# Patient Record
Sex: FEMALE | Race: WHITE | Employment: FULL TIME | URBAN - METROPOLITAN AREA
[De-identification: names, ages, dates, MRNs, and addresses within clinical notes are randomized per-mention and may not be internally consistent; named-entity substitution may affect disease eponyms.]

---

## 2015-09-23 LAB
AVERAGE GLUCOSE: 108
HBA1C MFR BLD: 5.4 %

## 2019-06-29 ENCOUNTER — HOSPITAL ENCOUNTER (OUTPATIENT)
Age: 59
Discharge: HOME | End: 2019-07-01
Payer: COMMERCIAL

## 2019-06-29 LAB
ALBUMIN SERPL-MCNC: 4.3 G/DL (ref 3.5–5.2)
ALP BLD-CCNC: 71 U/L (ref 35–104)
ALT SERPL-CCNC: 16 U/L (ref 0–32)
ANION GAP SERPL CALCULATED.3IONS-SCNC: 13 MMOL/L (ref 7–16)
AST SERPL-CCNC: 19 U/L (ref 0–31)
BACTERIA: ABNORMAL /HPF
BASOPHILS ABSOLUTE: 0.07 E9/L (ref 0–0.2)
BASOPHILS RELATIVE PERCENT: 1.1 % (ref 0–2)
BILIRUB SERPL-MCNC: 0.4 MG/DL (ref 0–1.2)
BILIRUBIN URINE: NEGATIVE
BLOOD, URINE: NEGATIVE
BUN BLDV-MCNC: 16 MG/DL (ref 6–20)
CALCIUM SERPL-MCNC: 9.9 MG/DL (ref 8.6–10.2)
CHLORIDE BLD-SCNC: 109 MMOL/L (ref 98–107)
CHOLESTEROL, TOTAL: 189 MG/DL (ref 0–199)
CLARITY: CLEAR
CO2: 22 MMOL/L (ref 22–29)
COLOR: YELLOW
CREAT SERPL-MCNC: 0.8 MG/DL (ref 0.5–1)
EOSINOPHILS ABSOLUTE: 0.27 E9/L (ref 0.05–0.5)
EOSINOPHILS RELATIVE PERCENT: 4.2 % (ref 0–6)
GFR AFRICAN AMERICAN: >60
GFR NON-AFRICAN AMERICAN: >60 ML/MIN/1.73
GLUCOSE BLD-MCNC: 93 MG/DL (ref 74–99)
GLUCOSE URINE: NEGATIVE MG/DL
HCT VFR BLD CALC: 40 % (ref 34–48)
HDLC SERPL-MCNC: 65 MG/DL
HEMOGLOBIN: 12.5 G/DL (ref 11.5–15.5)
IMMATURE GRANULOCYTES #: 0.02 E9/L
IMMATURE GRANULOCYTES %: 0.3 % (ref 0–5)
KETONES, URINE: NEGATIVE MG/DL
LDL CHOLESTEROL CALCULATED: 114 MG/DL (ref 0–99)
LEUKOCYTE ESTERASE, URINE: ABNORMAL
LYMPHOCYTES ABSOLUTE: 2.61 E9/L (ref 1.5–4)
LYMPHOCYTES RELATIVE PERCENT: 40.3 % (ref 20–42)
MCH RBC QN AUTO: 29.8 PG (ref 26–35)
MCHC RBC AUTO-ENTMCNC: 31.3 % (ref 32–34.5)
MCV RBC AUTO: 95.2 FL (ref 80–99.9)
MONOCYTES ABSOLUTE: 0.43 E9/L (ref 0.1–0.95)
MONOCYTES RELATIVE PERCENT: 6.6 % (ref 2–12)
NEUTROPHILS ABSOLUTE: 3.07 E9/L (ref 1.8–7.3)
NEUTROPHILS RELATIVE PERCENT: 47.5 % (ref 43–80)
NITRITE, URINE: NEGATIVE
PDW BLD-RTO: 13.3 FL (ref 11.5–15)
PH UA: 5.5 (ref 5–9)
PLATELET # BLD: 312 E9/L (ref 130–450)
PMV BLD AUTO: 10.3 FL (ref 7–12)
POTASSIUM SERPL-SCNC: 5 MMOL/L (ref 3.5–5)
PROTEIN UA: NEGATIVE MG/DL
RBC # BLD: 4.2 E12/L (ref 3.5–5.5)
RBC UA: ABNORMAL /HPF (ref 0–2)
SODIUM BLD-SCNC: 144 MMOL/L (ref 132–146)
SPECIFIC GRAVITY UA: 1.02 (ref 1–1.03)
TOTAL PROTEIN: 6.9 G/DL (ref 6.4–8.3)
TRIGL SERPL-MCNC: 49 MG/DL (ref 0–149)
UROBILINOGEN, URINE: 0.2 E.U./DL
VITAMIN D 25-HYDROXY: 46 NG/ML (ref 30–100)
VLDLC SERPL CALC-MCNC: 10 MG/DL
WBC # BLD: 6.5 E9/L (ref 4.5–11.5)
WBC UA: ABNORMAL /HPF (ref 0–5)

## 2019-06-29 PROCEDURE — 82306 VITAMIN D 25 HYDROXY: CPT

## 2019-06-29 PROCEDURE — 36415 COLL VENOUS BLD VENIPUNCTURE: CPT

## 2019-06-29 PROCEDURE — 80053 COMPREHEN METABOLIC PANEL: CPT

## 2019-06-29 PROCEDURE — 80061 LIPID PANEL: CPT

## 2019-06-29 PROCEDURE — 85025 COMPLETE CBC W/AUTO DIFF WBC: CPT

## 2019-06-29 PROCEDURE — 81001 URINALYSIS AUTO W/SCOPE: CPT

## 2019-08-07 ENCOUNTER — OFFICE VISIT (OUTPATIENT)
Dept: PRIMARY CARE CLINIC | Age: 59
End: 2019-08-07
Payer: COMMERCIAL

## 2019-08-07 VITALS
DIASTOLIC BLOOD PRESSURE: 76 MMHG | SYSTOLIC BLOOD PRESSURE: 122 MMHG | BODY MASS INDEX: 27.28 KG/M2 | WEIGHT: 180 LBS | OXYGEN SATURATION: 98 % | HEIGHT: 68 IN | HEART RATE: 70 BPM | TEMPERATURE: 97.8 F

## 2019-08-07 DIAGNOSIS — N39.41 URGE INCONTINENCE: ICD-10-CM

## 2019-08-07 DIAGNOSIS — R09.81 NASAL CONGESTION: Primary | ICD-10-CM

## 2019-08-07 DIAGNOSIS — M85.80 OSTEOPENIA, UNSPECIFIED LOCATION: ICD-10-CM

## 2019-08-07 PROCEDURE — 99214 OFFICE O/P EST MOD 30 MIN: CPT | Performed by: FAMILY MEDICINE

## 2019-08-07 RX ORDER — TOLTERODINE 4 MG/1
4 CAPSULE, EXTENDED RELEASE ORAL DAILY
Refills: 1 | COMMUNITY
Start: 2019-07-23

## 2019-08-07 RX ORDER — AZELASTINE 1 MG/ML
1 SPRAY, METERED NASAL 2 TIMES DAILY
Qty: 1 BOTTLE | Refills: 5 | Status: SHIPPED | OUTPATIENT
Start: 2019-08-07

## 2019-08-07 RX ORDER — AZELASTINE 1 MG/ML
1 SPRAY, METERED NASAL 2 TIMES DAILY
COMMUNITY
End: 2019-08-07 | Stop reason: SDUPTHER

## 2019-08-07 ASSESSMENT — ENCOUNTER SYMPTOMS
NAUSEA: 0
WHEEZING: 0
PHOTOPHOBIA: 0
BLOOD IN STOOL: 0
ABDOMINAL PAIN: 0
BACK PAIN: 0
COLOR CHANGE: 0
SINUS PRESSURE: 0
EYE REDNESS: 0
ABDOMINAL DISTENTION: 0
EYE PAIN: 0
EYE ITCHING: 0
SORE THROAT: 0
VOMITING: 0
COUGH: 0
DIARRHEA: 0
SHORTNESS OF BREATH: 0
SINUS PAIN: 0
CONSTIPATION: 0
CHEST TIGHTNESS: 0

## 2019-08-07 ASSESSMENT — PATIENT HEALTH QUESTIONNAIRE - PHQ9
SUM OF ALL RESPONSES TO PHQ QUESTIONS 1-9: 0
SUM OF ALL RESPONSES TO PHQ QUESTIONS 1-9: 0
SUM OF ALL RESPONSES TO PHQ9 QUESTIONS 1 & 2: 0
1. LITTLE INTEREST OR PLEASURE IN DOING THINGS: 0
2. FEELING DOWN, DEPRESSED OR HOPELESS: 0

## 2019-08-07 NOTE — PROGRESS NOTES
abused: Not on file     Forced sexual activity: Not on file   Other Topics Concern    Not on file   Social History Narrative    Not on file       Vitals:    08/07/19 1515   BP: 122/76   Pulse: 70   Temp: 97.8 °F (36.6 °C)   SpO2: 98%   Weight: 180 lb (81.6 kg)   Height: 5' 8\" (1.727 m)       Exam:  Physical Exam   Constitutional: She is oriented to person, place, and time. She appears well-developed and well-nourished. HENT:   Head: Normocephalic and atraumatic. Right Ear: External ear normal.   Left Ear: External ear normal.   Nose: Nose normal.   Mouth/Throat: Oropharynx is clear and moist.   Eyes: Pupils are equal, round, and reactive to light. Conjunctivae and EOM are normal.   Neck: Normal range of motion. Neck supple. Cardiovascular: Normal rate, regular rhythm, normal heart sounds and intact distal pulses. Pulmonary/Chest: Effort normal and breath sounds normal.   Abdominal: Soft. Bowel sounds are normal.   Musculoskeletal: Normal range of motion. Neurological: She is alert and oriented to person, place, and time. Skin: Skin is warm and dry. No rash noted. Psychiatric: She has a normal mood and affect.        Assessment and Plan:    Problem List        Musculoskeletal and Integument    Osteopenia    Relevant Orders    DEXA BONE DENSITY 2 SITES    Comprehensive Metabolic Panel    CBC Auto Differential    Lipid Panel    Urinalysis    Vitamin D 25 Hydroxy       Other    Urge incontinence    Relevant Medications    tolterodine (DETROL LA) 4 MG extended release capsule    Other Relevant Orders    Comprehensive Metabolic Panel    Nasal congestion - Primary    Relevant Medications    azelastine (ASTELIN) 0.1 % nasal spray    Other Relevant Orders    CBC Auto Differential    Lipid Panel       reviewed bw as discussed above  Colonoscopy 12/2016--ext hemorrhoids, mild diverticulosis, recheck in 10 years  utd mammogram  utd pap/pelvic exam  dexa scan 3/2016 spine --1.7, hip--1.6, patient currently taking

## 2019-08-28 ENCOUNTER — HOSPITAL ENCOUNTER (OUTPATIENT)
Dept: MAMMOGRAPHY | Age: 59
Discharge: HOME | End: 2019-08-30
Payer: COMMERCIAL

## 2019-08-28 DIAGNOSIS — M85.80 OSTEOPENIA, UNSPECIFIED LOCATION: ICD-10-CM

## 2019-08-28 PROCEDURE — 77080 DXA BONE DENSITY AXIAL: CPT

## 2019-10-31 ENCOUNTER — HOSPITAL ENCOUNTER (OUTPATIENT)
Age: 59
Discharge: HOME | End: 2019-11-02
Payer: COMMERCIAL

## 2019-10-31 ENCOUNTER — OFFICE VISIT (OUTPATIENT)
Dept: FAMILY MEDICINE CLINIC | Age: 59
End: 2019-10-31
Payer: COMMERCIAL

## 2019-10-31 VITALS
WEIGHT: 184.13 LBS | DIASTOLIC BLOOD PRESSURE: 70 MMHG | BODY MASS INDEX: 27.91 KG/M2 | HEIGHT: 68 IN | OXYGEN SATURATION: 99 % | TEMPERATURE: 98 F | SYSTOLIC BLOOD PRESSURE: 116 MMHG | HEART RATE: 72 BPM

## 2019-10-31 DIAGNOSIS — N30.01 ACUTE CYSTITIS WITH HEMATURIA: ICD-10-CM

## 2019-10-31 DIAGNOSIS — R30.0 DYSURIA: Primary | ICD-10-CM

## 2019-10-31 LAB
BILIRUBIN, POC: ABNORMAL
BLOOD URINE, POC: ABNORMAL
CLARITY, POC: ABNORMAL
COLOR, POC: YELLOW
GLUCOSE URINE, POC: ABNORMAL
KETONES, POC: ABNORMAL
LEUKOCYTE EST, POC: ABNORMAL
NITRITE, POC: ABNORMAL
PH, POC: 6
PROTEIN, POC: ABNORMAL
SPECIFIC GRAVITY, POC: 1.02
UROBILINOGEN, POC: 0.2

## 2019-10-31 PROCEDURE — G8427 DOCREV CUR MEDS BY ELIG CLIN: HCPCS | Performed by: FAMILY MEDICINE

## 2019-10-31 PROCEDURE — 3017F COLORECTAL CA SCREEN DOC REV: CPT | Performed by: FAMILY MEDICINE

## 2019-10-31 PROCEDURE — G8484 FLU IMMUNIZE NO ADMIN: HCPCS | Performed by: FAMILY MEDICINE

## 2019-10-31 PROCEDURE — 99213 OFFICE O/P EST LOW 20 MIN: CPT | Performed by: FAMILY MEDICINE

## 2019-10-31 PROCEDURE — G9899 SCRN MAM PERF RSLTS DOC: HCPCS | Performed by: FAMILY MEDICINE

## 2019-10-31 PROCEDURE — 87088 URINE BACTERIA CULTURE: CPT

## 2019-10-31 PROCEDURE — G8419 CALC BMI OUT NRM PARAM NOF/U: HCPCS | Performed by: FAMILY MEDICINE

## 2019-10-31 PROCEDURE — 1036F TOBACCO NON-USER: CPT | Performed by: FAMILY MEDICINE

## 2019-10-31 PROCEDURE — 87186 SC STD MICRODIL/AGAR DIL: CPT

## 2019-10-31 PROCEDURE — 81002 URINALYSIS NONAUTO W/O SCOPE: CPT | Performed by: FAMILY MEDICINE

## 2019-10-31 RX ORDER — CIPROFLOXACIN 500 MG/1
500 TABLET, FILM COATED ORAL 2 TIMES DAILY
Qty: 14 TABLET | Refills: 0 | Status: SHIPPED | OUTPATIENT
Start: 2019-10-31 | End: 2019-11-07

## 2019-10-31 ASSESSMENT — ENCOUNTER SYMPTOMS
WHEEZING: 0
EYE PAIN: 0
BACK PAIN: 0
CONSTIPATION: 0
VOMITING: 0
DIARRHEA: 0
SINUS PAIN: 0
COUGH: 0
TROUBLE SWALLOWING: 0
NAUSEA: 0
CHEST TIGHTNESS: 0
ABDOMINAL PAIN: 0
SORE THROAT: 0
SHORTNESS OF BREATH: 0

## 2019-11-02 LAB
ORGANISM: ABNORMAL
URINE CULTURE, ROUTINE: ABNORMAL

## 2019-11-04 DIAGNOSIS — B37.9 CANDIDIASIS: Primary | ICD-10-CM

## 2019-11-04 RX ORDER — FLUCONAZOLE 150 MG/1
150 TABLET ORAL DAILY
Qty: 3 TABLET | Refills: 0 | Status: SHIPPED | OUTPATIENT
Start: 2019-11-04 | End: 2019-11-07

## 2019-11-05 ENCOUNTER — TELEPHONE (OUTPATIENT)
Dept: FAMILY MEDICINE CLINIC | Age: 59
End: 2019-11-05

## 2020-03-05 ENCOUNTER — OFFICE VISIT (OUTPATIENT)
Dept: FAMILY MEDICINE CLINIC | Age: 60
End: 2020-03-05
Payer: COMMERCIAL

## 2020-03-05 VITALS
DIASTOLIC BLOOD PRESSURE: 70 MMHG | SYSTOLIC BLOOD PRESSURE: 122 MMHG | TEMPERATURE: 98.6 F | HEART RATE: 83 BPM | OXYGEN SATURATION: 98 %

## 2020-03-05 LAB
INFLUENZA A ANTIBODY: POSITIVE
INFLUENZA B ANTIBODY: NEGATIVE

## 2020-03-05 PROCEDURE — G8419 CALC BMI OUT NRM PARAM NOF/U: HCPCS | Performed by: NURSE PRACTITIONER

## 2020-03-05 PROCEDURE — 87804 INFLUENZA ASSAY W/OPTIC: CPT | Performed by: NURSE PRACTITIONER

## 2020-03-05 PROCEDURE — G8484 FLU IMMUNIZE NO ADMIN: HCPCS | Performed by: NURSE PRACTITIONER

## 2020-03-05 PROCEDURE — 3017F COLORECTAL CA SCREEN DOC REV: CPT | Performed by: NURSE PRACTITIONER

## 2020-03-05 PROCEDURE — 99213 OFFICE O/P EST LOW 20 MIN: CPT | Performed by: NURSE PRACTITIONER

## 2020-03-05 PROCEDURE — 1036F TOBACCO NON-USER: CPT | Performed by: NURSE PRACTITIONER

## 2020-03-05 PROCEDURE — G8427 DOCREV CUR MEDS BY ELIG CLIN: HCPCS | Performed by: NURSE PRACTITIONER

## 2020-03-05 RX ORDER — BROMPHENIRAMINE MALEATE, PSEUDOEPHEDRINE HYDROCHLORIDE, AND DEXTROMETHORPHAN HYDROBROMIDE 2; 30; 10 MG/5ML; MG/5ML; MG/5ML
10 SYRUP ORAL 4 TIMES DAILY PRN
Qty: 240 ML | Refills: 0 | Status: SHIPPED
Start: 2020-03-05 | End: 2021-10-28

## 2020-03-05 RX ORDER — AMPICILLIN TRIHYDRATE 250 MG
1000 CAPSULE ORAL
COMMUNITY

## 2020-03-05 NOTE — PROGRESS NOTES
Chief Complaint   Headache and Influenza (started 3/3)      History of Present Illness   Source of history provided by:  patient. Lorena Myers is a 61 y.o. old female who has a past medical history of:   Past Medical History:   Diagnosis Date    Nasal congestion     Osteopenia     Urge incontinence     Presents to the [facility] care with complaints of a nonproductive cough, body aches, chills, mild nausea, sore throat, and nasal congestion/drainage. States symptoms have been present x 3-4 days. States symptoms have progressed since onset. Denies any CP, SOB, abdominal pain, vomiting, diarrhea, rash, or lethargy. Has been taking Claritin D and Nyquil without symptomatic relief. Denies any hx of asthma, COPD, or tobacco use. Did not receive influenza vaccination this season. Review of Systems   Unless otherwise stated in this report or unable to obtain because of the patient's clinical or mental status as evidenced by the medical record, this patients's positive and negative responses for Review of Systems, constitutional, psych, eyes, ENT, cardiovascular, respiratory, gastrointestinal, neurological, genitourinary, musculoskeletal, integument systems and systems related to the presenting problem are either stated in the preceding or were negative for the symptoms and/or complaints related to the medical problem. Surgical History:  Past Surgical History:   Procedure Laterality Date    ENDOMETRIAL ABLATION  11/2007    Isamar Owens Right 07/2014    TUBAL LIGATION  1992     Social History:  reports that she has never smoked. She has never used smokeless tobacco. She reports current alcohol use.   Family History: family history includes Alzheimer's Disease in her mother; COPD in her father; Colon Polyps in her brother; High Blood Pressure in her mother; High Cholesterol in her

## 2020-03-07 ENCOUNTER — OFFICE VISIT (OUTPATIENT)
Dept: FAMILY MEDICINE CLINIC | Age: 60
End: 2020-03-07
Payer: COMMERCIAL

## 2020-03-07 VITALS
OXYGEN SATURATION: 99 % | HEART RATE: 105 BPM | DIASTOLIC BLOOD PRESSURE: 72 MMHG | TEMPERATURE: 99 F | SYSTOLIC BLOOD PRESSURE: 128 MMHG

## 2020-03-07 PROBLEM — J40 BRONCHITIS: Status: ACTIVE | Noted: 2020-03-07

## 2020-03-07 PROBLEM — J10.1 INFLUENZA A: Status: ACTIVE | Noted: 2020-03-07

## 2020-03-07 PROBLEM — J01.90 ACUTE INFECTION OF NASAL SINUS: Status: ACTIVE | Noted: 2020-03-07

## 2020-03-07 LAB
INFLUENZA A ANTIBODY: NORMAL
INFLUENZA B ANTIBODY: NORMAL

## 2020-03-07 PROCEDURE — 99213 OFFICE O/P EST LOW 20 MIN: CPT | Performed by: FAMILY MEDICINE

## 2020-03-07 PROCEDURE — 1036F TOBACCO NON-USER: CPT | Performed by: FAMILY MEDICINE

## 2020-03-07 PROCEDURE — 87804 INFLUENZA ASSAY W/OPTIC: CPT | Performed by: FAMILY MEDICINE

## 2020-03-07 PROCEDURE — G8484 FLU IMMUNIZE NO ADMIN: HCPCS | Performed by: FAMILY MEDICINE

## 2020-03-07 PROCEDURE — G8427 DOCREV CUR MEDS BY ELIG CLIN: HCPCS | Performed by: FAMILY MEDICINE

## 2020-03-07 PROCEDURE — G8419 CALC BMI OUT NRM PARAM NOF/U: HCPCS | Performed by: FAMILY MEDICINE

## 2020-03-07 PROCEDURE — 3017F COLORECTAL CA SCREEN DOC REV: CPT | Performed by: FAMILY MEDICINE

## 2020-03-07 RX ORDER — BENZONATATE 100 MG/1
100 CAPSULE ORAL 3 TIMES DAILY PRN
Qty: 30 CAPSULE | Refills: 0 | Status: SHIPPED | OUTPATIENT
Start: 2020-03-07 | End: 2020-03-14

## 2020-03-07 RX ORDER — OSELTAMIVIR PHOSPHATE 75 MG/1
75 CAPSULE ORAL 2 TIMES DAILY
Qty: 10 CAPSULE | Refills: 0 | Status: SHIPPED | OUTPATIENT
Start: 2020-03-07 | End: 2020-03-12

## 2020-03-07 RX ORDER — PREDNISONE 20 MG/1
TABLET ORAL
Qty: 15 TABLET | Refills: 0 | Status: SHIPPED | OUTPATIENT
Start: 2020-03-07 | End: 2021-10-28

## 2020-03-07 RX ORDER — DOXYCYCLINE HYCLATE 100 MG
100 TABLET ORAL 2 TIMES DAILY
Qty: 20 TABLET | Refills: 0 | Status: SHIPPED | OUTPATIENT
Start: 2020-03-07 | End: 2020-03-17

## 2020-03-07 NOTE — PROGRESS NOTES
Subjective:  Chief Complaint   Patient presents with    Cough    Head Congestion    Fever       HPI:  The patient states that they have had fever and chills for the last 5 days. The patient states fever temperature max is 100. The patient admits to malaise and myalgias. No contact exposures. The patient admits to  cough, runny nose, nasal congestion and sore throat. Cough productive of yellow phlegm  The patient denies nausea and vomiting. Bowel movement have been normal color and consistency. No diarrhea. No black or bloody stools. The patient denies dysuria urinary frequency, urgency and or gross hematuria. No flank pain. No chest pain or dyspnea. Patient present to the urgent care for evaluation. Was here 3/5 saw Cristy Martinez, tested positive for flu a, since ill for 72 hours was not given tamiflu, was given cough medication. ROS:  Positive and pertinent negatives as per HPI. All other systems are reviewed and negative.        Current Outpatient Medications:     doxycycline hyclate (VIBRA-TABS) 100 MG tablet, Take 1 tablet by mouth 2 times daily for 10 days, Disp: 20 tablet, Rfl: 0    predniSONE (DELTASONE) 20 MG tablet, 1 po bid x 5 days then 1 po daily x 5 days, Disp: 15 tablet, Rfl: 0    benzonatate (TESSALON) 100 MG capsule, Take 1 capsule by mouth 3 times daily as needed for Cough, Disp: 30 capsule, Rfl: 0    oseltamivir (TAMIFLU) 75 MG capsule, Take 1 capsule by mouth 2 times daily for 5 days, Disp: 10 capsule, Rfl: 0    Cinnamon 500 MG CAPS, Take by mouth, Disp: , Rfl:     brompheniramine-pseudoephedrine-DM 2-30-10 MG/5ML syrup, Take 10 mLs by mouth 4 times daily as needed for Congestion or Cough, Disp: 240 mL, Rfl: 0    tolterodine (DETROL LA) 4 MG extended release capsule, , Disp: , Rfl: 1    azelastine (ASTELIN) 0.1 % nasal spray, 1 spray by Nasal route 2 times daily Use in each nostril as directed, Disp: 1 Bottle, Rfl: 5   Allergies   Allergen Reactions    Penicillins Objective:  Vitals:    03/07/20 1046   BP: 128/72   Pulse: 105   Temp: 99 °F (37.2 °C)   SpO2: 99%        Exam:  Const: Appears healthy and well developed. No signs of acute distress present. Vitals reviewed per triage. Non-toxic appearing. Head/Face: Normocephalic, atraumatic. Facies is symmetric. Eyes: PERRL. ENMT:  Tympanic membranes are pearly gray with good light reflex bilaterally. Nares with discolored discharge. Buccal mucosa is moist. Erythema and yellow pnd in posterior pharynx. Neck: Supple and symmetric. Palpation reveals no adenopathy. No meninigeal signs. Trachea midline. Resp: Lungs with coarse breath sounds noted throughout. CV: Rhythm is regular. S1 is normal. S2 is normal. Extremities: No edema of the lower limbs bilaterally. Pulses are equal bilaterally. Abdomen: Abdomen soft, nontender to palpation. No masses or organomegaly. No rebound or guarding. Musculo: Walks with a normal gait. Patient moves extremities without pain or limitation. Skin: Skin is warm and dry. Neuro: Alert and oriented x3. Speech is articulate and fluent. Psych: Patient's mood and affect is appropriate     Diagnosis Orders   1. Acute non-recurrent sinusitis of other sinus  doxycycline hyclate (VIBRA-TABS) 100 MG tablet    predniSONE (DELTASONE) 20 MG tablet   2. Bronchitis  doxycycline hyclate (VIBRA-TABS) 100 MG tablet    predniSONE (DELTASONE) 20 MG tablet    benzonatate (TESSALON) 100 MG capsule   3. Influenza A  oseltamivir (TAMIFLU) 75 MG capsule     Fluids, rest, probiotic, nasal saline spray, cool mist vaporizer   Discussed the use of probiotics to help prevent GI issues including C-diff colitis.       Seen By:    Mark Rivero MD

## 2020-03-11 ENCOUNTER — OFFICE VISIT (OUTPATIENT)
Dept: FAMILY MEDICINE CLINIC | Age: 60
End: 2020-03-11
Payer: COMMERCIAL

## 2020-03-11 VITALS
OXYGEN SATURATION: 98 % | SYSTOLIC BLOOD PRESSURE: 126 MMHG | HEART RATE: 86 BPM | DIASTOLIC BLOOD PRESSURE: 78 MMHG | TEMPERATURE: 98.2 F

## 2020-03-11 PROCEDURE — G8484 FLU IMMUNIZE NO ADMIN: HCPCS | Performed by: FAMILY MEDICINE

## 2020-03-11 PROCEDURE — G8419 CALC BMI OUT NRM PARAM NOF/U: HCPCS | Performed by: FAMILY MEDICINE

## 2020-03-11 PROCEDURE — 3017F COLORECTAL CA SCREEN DOC REV: CPT | Performed by: FAMILY MEDICINE

## 2020-03-11 PROCEDURE — 99213 OFFICE O/P EST LOW 20 MIN: CPT | Performed by: FAMILY MEDICINE

## 2020-03-11 PROCEDURE — G8427 DOCREV CUR MEDS BY ELIG CLIN: HCPCS | Performed by: FAMILY MEDICINE

## 2020-03-11 PROCEDURE — 1036F TOBACCO NON-USER: CPT | Performed by: FAMILY MEDICINE

## 2020-03-11 ASSESSMENT — ENCOUNTER SYMPTOMS
CHEST TIGHTNESS: 1
GASTROINTESTINAL NEGATIVE: 1
COUGH: 1
SORE THROAT: 1
EYES NEGATIVE: 1
WHEEZING: 1
TROUBLE SWALLOWING: 1

## 2020-03-11 NOTE — PROGRESS NOTES
3/11/2020     Rey Betancourt (:  1960) is a 61 y.o. female, here for evaluation of the following medical concerns:    HPI  Patient here for worsening influenza A. Patient was here twice in the last week. Tested positive for influenza both times. Was only given medication at the second visit. Was also given doxycycline. Told to come in for chest x-ray if no better. Order was now placed. Could not reach her PCP for some reason. Was told to come to urgent care as no one could place orders. Still symptomatic. No further fevers. Concerned because of a history of pneumonia. Review of Systems   Constitutional: Positive for fatigue and fever. HENT: Positive for postnasal drip, sore throat and trouble swallowing. Eyes: Negative. Respiratory: Positive for cough, chest tightness and wheezing. Cardiovascular: Negative. Gastrointestinal: Negative. Musculoskeletal: Negative for neck pain. Skin: Negative for rash. Neurological: Negative for headaches. Hematological: Negative for adenopathy. All other systems reviewed and are negative. Prior to Visit Medications    Medication Sig Taking?  Authorizing Provider   doxycycline hyclate (VIBRA-TABS) 100 MG tablet Take 1 tablet by mouth 2 times daily for 10 days Yes Chante Molina MD   predniSONE (DELTASONE) 20 MG tablet 1 po bid x 5 days then 1 po daily x 5 days Yes Chante Molina MD   benzonatate (TESSALON) 100 MG capsule Take 1 capsule by mouth 3 times daily as needed for Cough Yes Chante Molina MD   oseltamivir (TAMIFLU) 75 MG capsule Take 1 capsule by mouth 2 times daily for 5 days Yes Chante Molina MD   Cinnamon 500 MG CAPS Take by mouth Yes Historical Provider, MD   tolterodine (DETROL LA) 4 MG extended release capsule  Yes Historical Provider, MD   azelastine (ASTELIN) 0.1 % nasal spray 1 spray by Nasal route 2 times daily Use in each nostril as directed Yes Chante Molina MD   brompheniramine-pseudoephedrine-DM 2-30-10 MG/5ML reveals no acute signs of consolidation or pneumonia. May be positive for atypical pneumonia. Final read per radiologist.  Albert Ciscodesiree patient continue current medications and go to ER if any worsening of symptoms in the interim. Velasquez Troncoso D.O.   8:52 AM  3/11/2020       This document may have been prepared at least partially through the use of voice recognition software. Although effort is taken to assure the accuracy of this document, it is possible that grammatical, syntax,  or spelling errors may occur.

## 2020-04-06 PROBLEM — J10.1 INFLUENZA A: Status: RESOLVED | Noted: 2020-03-07 | Resolved: 2020-04-06

## 2021-04-08 ENCOUNTER — OFFICE VISIT (OUTPATIENT)
Dept: NEUROLOGY | Age: 61
End: 2021-04-08
Payer: COMMERCIAL

## 2021-04-08 VITALS
HEIGHT: 69 IN | RESPIRATION RATE: 20 BRPM | SYSTOLIC BLOOD PRESSURE: 130 MMHG | DIASTOLIC BLOOD PRESSURE: 82 MMHG | HEART RATE: 74 BPM | WEIGHT: 174 LBS | BODY MASS INDEX: 25.77 KG/M2

## 2021-04-08 DIAGNOSIS — G25.0 FAMILIAL TREMOR: Primary | ICD-10-CM

## 2021-04-08 PROCEDURE — 1036F TOBACCO NON-USER: CPT | Performed by: CLINICAL NURSE SPECIALIST

## 2021-04-08 PROCEDURE — 99204 OFFICE O/P NEW MOD 45 MIN: CPT | Performed by: CLINICAL NURSE SPECIALIST

## 2021-04-08 PROCEDURE — G8427 DOCREV CUR MEDS BY ELIG CLIN: HCPCS | Performed by: CLINICAL NURSE SPECIALIST

## 2021-04-08 PROCEDURE — G8419 CALC BMI OUT NRM PARAM NOF/U: HCPCS | Performed by: CLINICAL NURSE SPECIALIST

## 2021-04-08 PROCEDURE — 3017F COLORECTAL CA SCREEN DOC REV: CPT | Performed by: CLINICAL NURSE SPECIALIST

## 2021-04-08 RX ORDER — KRILL/OM-3/DHA/EPA/PHOSPHO/AST 500MG-86MG
CAPSULE ORAL DAILY
COMMUNITY

## 2021-04-08 NOTE — PROGRESS NOTES
Esha Gardner is a 64 y.o. right handed woman    Past Medical History:     Past Medical History:   Diagnosis Date    Nasal congestion     Osteopenia     Urge incontinence      Past Surgical History:     Past Surgical History:   Procedure Laterality Date    ENDOMETRIAL ABLATION  11/2007    Isamar - Dr. Guthrie Burke Right 07/2014    TUBAL LIGATION  1992     Allergies:     Penicillins    Medications:     Prior to Admission medications    Medication Sig Start Date End Date Taking?  Authorizing Provider   Nghia Mendoza 500 MG CAPS Take by mouth   Yes Historical Provider, MD   Cinnamon 500 MG CAPS Take by mouth   Yes Historical Provider, MD   tolterodine (DETROL LA) 4 MG extended release capsule  7/23/19  Yes Historical Provider, MD   azelastine (ASTELIN) 0.1 % nasal spray 1 spray by Nasal route 2 times daily Use in each nostril as directed 8/7/19  Yes Irl Hammans, MD   predniSONE (DELTASONE) 20 MG tablet 1 po bid x 5 days then 1 po daily x 5 days  Patient not taking: Reported on 4/8/2021 3/7/20   Irl Hammans, MD   brompheniramine-pseudoephedrine-DM 2-30-10 MG/5ML syrup Take 10 mLs by mouth 4 times daily as needed for Congestion or Cough  Patient not taking: Reported on 3/11/2020 3/5/20   LIVIA Dorman - CNP     Social History:     Social History     Tobacco Use    Smoking status: Never Smoker    Smokeless tobacco: Never Used   Substance Use Topics    Alcohol use: Yes     Comment: Rarely    Drug use: Never       Review of Systems:     No chest pain or palpitations  No SOB  No vertigo, lightheadedness or loss of consciousness  No falls, tripping or stumbling  No incontinence of bowels or bladder  No itching or bruising appreciated    ROS otherwise negative     Family History:     Family History   Problem Relation Age of Onset    Alzheimer's Disease Mother     High Blood Pressure Mother     Thyroid Disease Mother     COPD Father     Other Father         AAA    High Cholesterol Brother     Colon Polyps Brother     Other Paternal Aunt         AAA    Other Paternal Uncle         AAA        History of Present Illness:     Patient states she began noticing a tremor a few years ago. Primarily in her right hand but also noted in her left hand.   She definitely feels her right hand is more involved    Over the last few months it has worsened    Occasionally, she develops a pain in her right shoulder blade which seems to radiate down her arm causing it to shake more  No weakness  No falls  She is not dropping things    She did order a heavy wrist bracelet which seems to help clam her tremor but is not 100% effective    No trouble chewing or swallowing    Nothing seems to worsen her shaking - holding a glass or spoon does not aggravate it   No neck pains    No history of stroke or seizure    Her 27year old son has a severe tremor in both hands  Her mother also had a tremor  None of them have been evaluated by a specialist     Here with her  - on their way to [facility] for a horse sale       Objective:   /82 (Site: Right Upper Arm, Position: Sitting, Cuff Size: Medium Adult)   Pulse 74   Resp 20   Ht 5' 8.5\" (1.74 m)   Wt 174 lb (78.9 kg)   BMI 26.07 kg/m²      General appearance: alert, appears stated age and cooperative  Head: Normocephalic, without obvious abnormality, atraumatic  Neck: no adenopathy, no carotid bruit and limited ROM  Lungs: clear to auscultation bilaterally  Heart: regular rate and rhythm  Extremities: no cyanosis or edema  Pulses: 2+ and symmetric  Skin: no rashes or lesions      Grimaces to palpation of right trapezius muscle     Mental Status: Alert, oriented to person place and year     Speech: clear  Language: appropriate     minimal mask-like facies  No Myerson's sign     Cranial Nerves:  I: smell    II: visual acuity     II: visual fields Full    II: pupils THERESE III,VII: ptosis None   III,IV,VI: extraocular muscles  EOMI without nystagmus    V: mastication Normal   V: facial light touch sensation  Normal   V,VII: corneal reflex  Present   VII: facial muscle function - upper     VII: facial muscle function - lower Normal   VIII: hearing Normal   IX: soft palate elevation  Normal   IX,X: gag reflex    XI: trapezius strength  5/5   XI: sternocleidomastoid strength 5/5   XI: neck extension strength  5/5   XII: tongue strength  Normal     Motor:  5/5 throughout  Normal bulk and tone     Minimal tremor with outstretched hands  No resting tremor  No ataxic tremor    cogwheel rigidity appreciated in right elbow   No bradykinesia     Sensory:  LT normal  Vibration normal     Coordination:   FN, FFM and ROSAMARIA normal  HS normal    Gait:  Normal without bradykinesia     DTR:   No reflexes    No Simeon's     Laboratory/Radiology:     CBC with Differential:    Lab Results   Component Value Date    WBC 5.6 09/25/2020    RBC 4.33 09/25/2020    HGB 13.2 09/25/2020    HCT 38.8 09/25/2020     09/25/2020    MCV 89.5 09/25/2020    MCH 30.6 09/25/2020    MCHC 34.1 09/25/2020    RDW 13.7 09/25/2020    SEGSPCT 50.8 09/25/2020    LYMPHOPCT 38.7 09/25/2020    MONOPCT 4.7 09/25/2020    BASOPCT 1.1 09/25/2020    MONOSABS 0.3 09/25/2020    LYMPHSABS 2.2 09/25/2020    EOSABS 0.3 09/25/2020    BASOSABS 0.1 09/25/2020     CMP:    Lab Results   Component Value Date     09/25/2020    K 3.7 09/25/2020     09/25/2020    CO2 25 09/25/2020    BUN 15 09/25/2020    CREATININE 0.7 09/25/2020    GFRAA >60 06/29/2019    AGRATIO 1.5 09/25/2020    LABGLOM 85 09/25/2020    GLUCOSE 87 09/25/2020    PROT 7.6 09/25/2020    LABALBU 4.6 09/25/2020    CALCIUM 9.3 09/25/2020    BILITOT 0.7 09/25/2020    ALKPHOS 61 09/25/2020    AST 19 09/25/2020    ALT 18 09/25/2020     TSH:    Lab Results   Component Value Date    TSH 3.71 09/25/2020       I independently reviewed the lab studies today.      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     However anusha exam findings warrant further monitoring and investigation     She also displays a right trapezius spasms which is more than likely from her tremor rather than it causing her tremor     Plan: Will have her evaluated by Dr Stacey Hobson for trigger pt when they return    Will consider medication trial but elected to hold off on anything at this time and will monitor for changes    CT Head will be obtained    I may consider [facility] to help further aid diagnosis     Will RTO will Dr Sixto Vargas and with me in 2-3 months     Lavern Servin  11:48 AM  4/8/2021    I spent 65 minutes with the patient, with 50% or more counseling them on their diagnosis, diagnostic workup and treatment options.

## 2021-04-13 ENCOUNTER — TELEPHONE (OUTPATIENT)
Dept: NEUROLOGY | Age: 61
End: 2021-04-13

## 2021-04-13 DIAGNOSIS — R25.1 TREMOR: Primary | ICD-10-CM

## 2021-04-15 DIAGNOSIS — R25.1 TREMOR: Primary | ICD-10-CM

## 2021-04-22 DIAGNOSIS — R25.1 TREMOR: Primary | ICD-10-CM

## 2021-04-22 DIAGNOSIS — G25.0 FAMILIAL TREMOR: ICD-10-CM

## 2021-04-27 ENCOUNTER — TELEPHONE (OUTPATIENT)
Dept: NEUROLOGY | Age: 61
End: 2021-04-27

## 2021-04-27 NOTE — TELEPHONE ENCOUNTER
[employer] [place] Approval of MRI Juanjose London @ [facility] - [id number], Valid 4/22/2021 - 10/19/2021.     Order along with Authorization to Giovanni Brown @ [phone], per patient's request.   Confirmation Received  Electronically signed by Inna Wilde on 4/27/21 at 9:33 AM EDT

## 2021-05-04 ENCOUNTER — PROCEDURE VISIT (OUTPATIENT)
Dept: NEUROLOGY | Age: 61
End: 2021-05-04
Payer: COMMERCIAL

## 2021-05-04 VITALS
SYSTOLIC BLOOD PRESSURE: 148 MMHG | BODY MASS INDEX: 26.07 KG/M2 | DIASTOLIC BLOOD PRESSURE: 78 MMHG | HEART RATE: 52 BPM | TEMPERATURE: 97.4 F | WEIGHT: 174 LBS | OXYGEN SATURATION: 100 %

## 2021-05-04 DIAGNOSIS — M79.18 BILATERAL MYOFASCIAL PAIN: Primary | ICD-10-CM

## 2021-05-04 DIAGNOSIS — M25.519 TRIGGER POINT OF SHOULDER REGION, UNSPECIFIED LATERALITY: ICD-10-CM

## 2021-05-04 PROCEDURE — 99203 OFFICE O/P NEW LOW 30 MIN: CPT | Performed by: PSYCHIATRY & NEUROLOGY

## 2021-05-04 PROCEDURE — G8419 CALC BMI OUT NRM PARAM NOF/U: HCPCS | Performed by: PSYCHIATRY & NEUROLOGY

## 2021-05-04 PROCEDURE — 1036F TOBACCO NON-USER: CPT | Performed by: PSYCHIATRY & NEUROLOGY

## 2021-05-04 PROCEDURE — 20553 NJX 1/MLT TRIGGER POINTS 3/>: CPT | Performed by: PSYCHIATRY & NEUROLOGY

## 2021-05-04 PROCEDURE — G8428 CUR MEDS NOT DOCUMENT: HCPCS | Performed by: PSYCHIATRY & NEUROLOGY

## 2021-05-04 PROCEDURE — 3017F COLORECTAL CA SCREEN DOC REV: CPT | Performed by: PSYCHIATRY & NEUROLOGY

## 2021-05-04 RX ORDER — BUPIVACAINE HYDROCHLORIDE 5 MG/ML
30 INJECTION, SOLUTION PERINEURAL ONCE
Status: COMPLETED | OUTPATIENT
Start: 2021-05-04 | End: 2021-05-04

## 2021-05-04 RX ORDER — LIDOCAINE HYDROCHLORIDE 20 MG/ML
5 INJECTION, SOLUTION INFILTRATION; PERINEURAL ONCE
Status: COMPLETED | OUTPATIENT
Start: 2021-05-04 | End: 2021-05-04

## 2021-05-04 RX ADMIN — LIDOCAINE HYDROCHLORIDE 5 ML: 20 INJECTION, SOLUTION INFILTRATION; PERINEURAL at 12:47

## 2021-05-04 RX ADMIN — BUPIVACAINE HYDROCHLORIDE 150 MG: 5 INJECTION, SOLUTION PERINEURAL at 12:46

## 2021-05-04 ASSESSMENT — ENCOUNTER SYMPTOMS
SHORTNESS OF BREATH: 0
PHOTOPHOBIA: 0
NAUSEA: 0
VOMITING: 0
TROUBLE SWALLOWING: 0

## 2021-05-04 NOTE — PROGRESS NOTES
Trigger point injection    Mixture of 50/50 proportion of Lidocaine 2% and Bupivacaine 0.5% was applied as a subcutaneous injections to the trigger points in the area of 3 paraspinal muscles: Semispinalis capitis muscle, Splenius capitis and Trapezius muscles bilaterally, using 0.5cc in each point, total of 6cc. Pain and stiffness before the procedure: 8/10  Pain and stiffness after the procedure: 3/10    Date/Time: 5/4/2021, 11:50 AM  Performed by: Goyo BLAIR  Authorized by:ELVIS BLAIR  Consent: Verbal consent obtained. Written consent obtained.   Risks and benefits: risks, benefits and alternatives were discussed  Consent given by: patient  Patient understanding: patient states understanding of the procedure being performed  Body area: Neck and Back  Laterality: bilateral   Sedation:  Patient sedated: no  Local Anesthetic: lidocaine 2% without epinephrine and bupivacaine 0.5% without epinephrine  Outcome: pain improved  Patient tolerance: Patient tolerated the procedure well with no immediate complications

## 2021-05-04 NOTE — PROGRESS NOTES
NEUROLOGY NEW PATIENT NOTE     Date: 5/4/2021  Name: Ricardo Flores  MRN: [id number]  Patient's PCP: Valdez Sapp MD     Dear, Dr. Valdez Sapp MD    REASON FOR VISIT/CHIEF COMPLAINT: Upper back and shoulder pain     HISTORY OF PRESENT ILLNESS: Ricardo Flores is a 64 y.o. patient female with past medical history of tremors is coming in with upper back and shoulder pain. Patient reports that he has been having upper back and shoulder pain which is intermittent, and gets worse with exercise and horse riding, comes and goes. She also reports some neck spasm, right arm numbness tingling and trigger point. No other aggravating relieving factors  No other associated symptoms    I have personally reviewed her medical records.     PAST MEDICAL HISTORY:   Past Medical History:   Diagnosis Date    Nasal congestion     Osteopenia     Urge incontinence      PAST SURGICAL HISTORY:   Past Surgical History:   Procedure Laterality Date    ENDOMETRIAL ABLATION  11/2007    [place] - Dr. Mague Taylor    OVARIAN CYST DRAINAGE      OVARIAN CYST REMOVAL      REPAIR RETINAL TEAR/HOLE Right 07/2014    TUBAL LIGATION  1992     FAMILY MEDICAL HISTORY:   Family History   Problem Relation Age of Onset    Alzheimer's Disease Mother     High Blood Pressure Mother     Thyroid Disease Mother     COPD Father     Other Father         AAA    High Cholesterol Brother     Colon Polyps Brother     Other Paternal Aunt         AAA    Other Paternal Uncle         AAA     SOCIAL HISTORY:   Social History     Socioeconomic History    Marital status:      Spouse name: Not on file    Number of children: Not on file    Years of education: Not on file    Highest education level: Not on file   Occupational History    Not on file   Social Needs    Financial resource strain: Not on file    Food insecurity     Worry: Not on file     Inability: Not on file    Transportation needs     Medical: Not on file Non-medical: Not on file   Tobacco Use    Smoking status: Never Smoker    Smokeless tobacco: Never Used   Substance and Sexual Activity    Alcohol use: Yes     Comment: Rarely    Drug use: Never    Sexual activity: Not on file   Lifestyle    Physical activity     Days per week: Not on file     Minutes per session: Not on file    Stress: Not on file   Relationships    Social connections     Talks on phone: Not on file     Gets together: Not on file     Attends Christian service: Not on file     Active member of club or organization: Not on file     Attends meetings of clubs or organizations: Not on file     Relationship status: Not on file    Intimate partner violence     Fear of current or ex partner: Not on file     Emotionally abused: Not on file     Physically abused: Not on file     Forced sexual activity: Not on file   Other Topics Concern    Not on file   Social History Narrative    Not on file      E-Cigarettes/Vaping Use     Questions Responses    E-Cigarette/Vaping Use     Start Date     Passive Exposure     Quit Date     Counseling Given     Comments          Allergy:   Allergies   Allergen Reactions    Penicillins      MEDS:   Current Outpatient Medications:     Krill Oil 500 MG CAPS, Take by mouth, Disp: , Rfl:     predniSONE (DELTASONE) 20 MG tablet, 1 po bid x 5 days then 1 po daily x 5 days (Patient not taking: Reported on 4/8/2021), Disp: 15 tablet, Rfl: 0    Cinnamon 500 MG CAPS, Take by mouth, Disp: , Rfl:     brompheniramine-pseudoephedrine-DM 2-30-10 MG/5ML syrup, Take 10 mLs by mouth 4 times daily as needed for Congestion or Cough (Patient not taking: Reported on 3/11/2020), Disp: 240 mL, Rfl: 0    tolterodine (DETROL LA) 4 MG extended release capsule, , Disp: , Rfl: 1    azelastine (ASTELIN) 0.1 % nasal spray, 1 spray by Nasal route 2 times daily Use in each nostril as directed, Disp: 1 Bottle, Rfl: 5    REVIEW OF SYSTEMS  Review of Systems   Constitutional: Negative for appetite change, fatigue and unexpected weight change. HENT: Negative for drooling, hearing loss, tinnitus and trouble swallowing. Eyes: Negative for photophobia and visual disturbance. Respiratory: Negative for shortness of breath. Cardiovascular: Negative for palpitations. Gastrointestinal: Negative for nausea and vomiting. Endocrine: Negative for polyuria. Genitourinary: Negative for flank pain. Musculoskeletal: Negative for neck pain and neck stiffness. Skin: Negative for rash. Allergic/Immunologic: Negative for food allergies. Neurological: Negative for dizziness, tremors, seizures, syncope, speech difficulty, weakness, light-headedness, numbness and headaches. Hematological: Negative for adenopathy. Psychiatric/Behavioral: Negative for agitation, behavioral problems and sleep disturbance. PHYSICAL EXAM:   BP (!) 148/78 (Site: Right Upper Arm, Position: Sitting, Cuff Size: Medium Adult)   Pulse 52   Temp 97.4 °F (36.3 °C)   Wt 174 lb (78.9 kg)   SpO2 100%   BMI 26.07 kg/m²     Trigger points noted in the upper back and shoulder regions.       DIAGNOSTIC TESTS:     I have personally reviewed the most recent lab results:    Sodium   Date Value Ref Range Status   09/25/2020 137 135 - 145 mEq/L Final   06/29/2019 144 132 - 146 mmol/L Final     Potassium   Date Value Ref Range Status   09/25/2020 3.7 3.6 - 5.0 mEq/L Final   06/29/2019 5.0 3.5 - 5.0 mmol/L Final     Chloride   Date Value Ref Range Status   09/25/2020 103 98 - 107 mEq/L Final   06/29/2019 109 (H) 98 - 107 mmol/L Final     CO2   Date Value Ref Range Status   09/25/2020 25 21 - 31 mEq/L Final   06/29/2019 22 22 - 29 mmol/L Final     BUN   Date Value Ref Range Status   09/25/2020 15 8 - 21 mg/dL Final   06/29/2019 16 6 - 20 mg/dL Final     CREATININE   Date Value Ref Range Status   09/25/2020 0.7 0.4 - 1.0 mg/dL Final   06/29/2019 0.8 0.5 - 1.0 mg/dL Final     GFR Non-   Date Value Ref Range Status 09/25/2020 85 >60 mL/min Final   06/29/2019 >60 >=60 mL/min/1.73 Final     Comment:     Chronic Kidney Disease: less than 60 ml/min/1.73 sq.m. Kidney Failure: less than 15 ml/min/1.73 sq.m. Results valid for patients 18 years and older. Calcium   Date Value Ref Range Status   09/25/2020 9.3 8.5 - 10.5 mg/dL Final   06/29/2019 9.9 8.6 - 10.2 mg/dL Final     WBC   Date Value Ref Range Status   09/25/2020 5.6 4.5 - 11.0 K/uL Final   06/29/2019 6.5 4.5 - 11.5 E9/L Final     Hemoglobin   Date Value Ref Range Status   09/25/2020 13.2 12.0 - 15.0 g/dL Final   06/29/2019 12.5 11.5 - 15.5 g/dL Final     Hematocrit   Date Value Ref Range Status   09/25/2020 38.8 36.0 - 44.0 % Final   06/29/2019 40.0 34.0 - 48.0 % Final     Platelets   Date Value Ref Range Status   09/25/2020 304 150 - 450 K/uL Final   06/29/2019 312 130 - 450 E9/L Final     Neutrophils %   Date Value Ref Range Status   06/29/2019 47.5 43.0 - 80.0 % Final     Monocytes %   Date Value Ref Range Status   09/25/2020 4.7 3.4 - 9.0 % Final   06/29/2019 6.6 2.0 - 12.0 % Final     Total Protein   Date Value Ref Range Status   09/25/2020 7.6 5.9 - 7.8 g/dL Final   06/29/2019 6.9 6.4 - 8.3 g/dL Final     Total Bilirubin   Date Value Ref Range Status   09/25/2020 0.7 0.3 - 1.5 mg/dL Final   06/29/2019 0.4 0.0 - 1.2 mg/dL Final     Alkaline Phosphatase   Date Value Ref Range Status   09/25/2020 61 42 - 121 U/L Final   06/29/2019 71 35 - 104 U/L Final     ALT   Date Value Ref Range Status   09/25/2020 18 10 - 54 U/L Final   06/29/2019 16 0 - 32 U/L Final     AST   Date Value Ref Range Status   09/25/2020 19 10 - 41 U/L Final   06/29/2019 19 0 - 31 U/L Final     No results found for: PTT, INR  Lab Results   Component Value Date    TRIG 120 09/25/2020    HDL 69 09/25/2020     No components found for: HGBA1C  No results found for: PROTEINCSF, GLUCCSF, WBCCSF    Controlled Substance Monitoring:    Acute and Chronic Pain Monitoring:   No flowsheet data found. MEDICAL DECISION MAKING  ASSESSMENT/PLAN    Tapan Dong was seen today for procedure. Diagnoses and all orders for this visit:    Bilateral myofascial pain    Trigger point of shoulder region, unspecified laterality    · She will benefit from trigger point injections. · This will be performed in the office today  · Risk and benefits and side effects were discussed in detail with the patient. Follow-up as needed  Thank you for involving me in the care of your patient. Patient's current medication list, allergies, problem list and results of all previously ordered testing and scans were reviewed at today's visit.       Wendy Polk MD    [facility]  [address] [place] [address]

## 2021-05-10 ENCOUNTER — HOSPITAL ENCOUNTER (OUTPATIENT)
Dept: MRI IMAGING | Age: 61
Discharge: HOME | End: 2021-05-12
Payer: COMMERCIAL

## 2021-05-10 DIAGNOSIS — R25.1 TREMOR: ICD-10-CM

## 2021-05-10 DIAGNOSIS — G25.0 FAMILIAL TREMOR: ICD-10-CM

## 2021-05-10 PROCEDURE — 70551 MRI BRAIN STEM W/O DYE: CPT

## 2021-07-25 NOTE — PROGRESS NOTES
Andreas Perry is a 64 y.o. right handed woman    Patient states she began noticing a tremor a few years ago. Primarily in her right hand but also noted in her left hand.   She definitely feels her right hand is more involved  Over the last few months it has worsened-especially after she contracted COVID-19    Right shoulder pain was resolved with trigger point injections however her tremor remains unchanged  No weakness  No falls  She is not dropping things    She did order a heavy wrist bracelet which seems to help clam her tremor but is not 100% effective  Is also seem to be inconvenient being on her right hand    No trouble chewing or swallowing    Nothing seems to worsen her shaking - holding a glass or spoon does not aggravate it   No neck pains    No history of stroke or seizure    Her 27year old son has a severe tremor in both hands  Her mother also had a tremor  None of them have been evaluated by a specialist     Last appointment we did obtain an MRI of her brain which was unremarkable    Here with her      Objective:   /79 (Site: Right Upper Arm, Position: Sitting, Cuff Size: Medium Adult)   Pulse 67   Temp 98.1 °F (36.7 °C)   Resp 20   Ht 5' 8.5\" (1.74 m)   Wt 174 lb (78.9 kg)   SpO2 98%   BMI 26.07 kg/m²      General appearance: alert, appears stated age and cooperative  Head: Normocephalic, without obvious abnormality, atraumatic  Extremities: no cyanosis or edema  Pulses: 2+ and symmetric  Skin: no rashes or lesions      Mental Status: Alert, oriented to person place and year     Speech: clear  Language: appropriate     minimal mask-like facies  No Myerson's sign     Cranial Nerves:  I: smell    II: visual acuity     II: visual fields Full    II: pupils THERESE   III,VII: ptosis None   III,IV,VI: extraocular muscles  EOMI without nystagmus    V: mastication Normal   V: facial light touch sensation  Normal   V,VII: corneal reflex  Present   VII: facial muscle function - upper VII: facial muscle function - lower Normal   VIII: hearing Normal   IX: soft palate elevation  Normal   IX,X: gag reflex    XI: trapezius strength  5/5   XI: sternocleidomastoid strength 5/5   XI: neck extension strength  5/5   XII: tongue strength  Normal     Motor:  5/5 throughout  Normal bulk and tone     Minimal tremor with outstretched hands   However tremor noted bilaterally right did appear worse than left  No resting tremor  No ataxic tremor    No cogwheel rigidity appreciated in right elbow today  No bradykinesia     Sensory:  LT normal  Vibration normal     Coordination:   FN, FFM and ROSAMARIA normal  HS normal    Gait:  Normal without bradykinesia   Swings arms during ambulation    DTR:   No reflexes    No Simeon's     Laboratory/Radiology:     CBC with Differential:    Lab Results   Component Value Date    WBC 5.6 09/25/2020    RBC 4.33 09/25/2020    HGB 13.2 09/25/2020    HCT 38.8 09/25/2020     09/25/2020    MCV 89.5 09/25/2020    MCH 30.6 09/25/2020    MCHC 34.1 09/25/2020    RDW 13.7 09/25/2020    SEGSPCT 50.8 09/25/2020    LYMPHOPCT 38.7 09/25/2020    MONOPCT 4.7 09/25/2020    BASOPCT 1.1 09/25/2020    MONOSABS 0.3 09/25/2020    LYMPHSABS 2.2 09/25/2020    EOSABS 0.3 09/25/2020    BASOSABS 0.1 09/25/2020     CMP:    Lab Results   Component Value Date     09/25/2020    K 3.7 09/25/2020     09/25/2020    CO2 25 09/25/2020    BUN 15 09/25/2020    CREATININE 0.7 09/25/2020    GFRAA >60 06/29/2019    AGRATIO 1.5 09/25/2020    LABGLOM 85 09/25/2020    GLUCOSE 87 09/25/2020    PROT 7.6 09/25/2020    LABALBU 4.6 09/25/2020    CALCIUM 9.3 09/25/2020    BILITOT 0.7 09/25/2020    ALKPHOS 61 09/25/2020    AST 19 09/25/2020    ALT 18 09/25/2020     TSH:    Lab Results   Component Value Date    TSH 3.71 09/25/2020     MRI Brain 2021  Unremarkable exam    I independently reviewed the lab studies today.      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

## 2021-07-26 ENCOUNTER — OFFICE VISIT (OUTPATIENT)
Dept: NEUROLOGY | Age: 61
End: 2021-07-26
Payer: COMMERCIAL

## 2021-07-26 VITALS
SYSTOLIC BLOOD PRESSURE: 135 MMHG | HEIGHT: 69 IN | RESPIRATION RATE: 20 BRPM | BODY MASS INDEX: 25.77 KG/M2 | OXYGEN SATURATION: 98 % | TEMPERATURE: 98.1 F | WEIGHT: 174 LBS | DIASTOLIC BLOOD PRESSURE: 79 MMHG | HEART RATE: 67 BPM

## 2021-07-26 DIAGNOSIS — R25.1 TREMOR: Primary | ICD-10-CM

## 2021-07-26 PROCEDURE — 3017F COLORECTAL CA SCREEN DOC REV: CPT | Performed by: CLINICAL NURSE SPECIALIST

## 2021-07-26 PROCEDURE — G8419 CALC BMI OUT NRM PARAM NOF/U: HCPCS | Performed by: CLINICAL NURSE SPECIALIST

## 2021-07-26 PROCEDURE — G8427 DOCREV CUR MEDS BY ELIG CLIN: HCPCS | Performed by: CLINICAL NURSE SPECIALIST

## 2021-07-26 PROCEDURE — 99214 OFFICE O/P EST MOD 30 MIN: CPT | Performed by: CLINICAL NURSE SPECIALIST

## 2021-07-26 PROCEDURE — 1036F TOBACCO NON-USER: CPT | Performed by: CLINICAL NURSE SPECIALIST

## 2021-07-26 RX ORDER — PROPRANOLOL HCL 60 MG
60 CAPSULE, EXTENDED RELEASE 24HR ORAL DAILY
Qty: 30 CAPSULE | Refills: 2 | Status: SHIPPED
Start: 2021-07-26 | End: 2021-10-18 | Stop reason: SDUPTHER

## 2021-07-26 RX ORDER — HYDROCHLOROTHIAZIDE 25 MG/1
25 TABLET ORAL PRN
COMMUNITY
Start: 2021-07-21

## 2021-08-03 ENCOUNTER — TELEPHONE (OUTPATIENT)
Dept: NEUROLOGY | Age: 61
End: 2021-08-03

## 2021-08-03 NOTE — TELEPHONE ENCOUNTER
Patient called with update on Inderal 60, states does not see any change as of yet.   Electronically signed by Jigna Mojica on 8/3/21 at 1:23 PM EDT

## 2021-08-27 ENCOUNTER — TELEPHONE (OUTPATIENT)
Dept: NEUROLOGY | Age: 61
End: 2021-08-27

## 2021-08-27 NOTE — TELEPHONE ENCOUNTER
Patient called stating the Inderal is somewhat helping but still having some tremors.   Electronically signed by David Boo on 8/27/21 at 9:06 AM EDT

## 2021-09-30 ENCOUNTER — PROCEDURE VISIT (OUTPATIENT)
Dept: NEUROLOGY | Age: 61
End: 2021-09-30
Payer: COMMERCIAL

## 2021-09-30 VITALS
TEMPERATURE: 97.7 F | SYSTOLIC BLOOD PRESSURE: 132 MMHG | DIASTOLIC BLOOD PRESSURE: 77 MMHG | HEART RATE: 66 BPM | OXYGEN SATURATION: 99 % | RESPIRATION RATE: 16 BRPM

## 2021-09-30 DIAGNOSIS — M25.519 TRIGGER POINT OF SHOULDER REGION, UNSPECIFIED LATERALITY: Primary | ICD-10-CM

## 2021-09-30 DIAGNOSIS — M79.18 MYOFASCIAL PAIN: ICD-10-CM

## 2021-09-30 PROCEDURE — 20553 NJX 1/MLT TRIGGER POINTS 3/>: CPT | Performed by: PSYCHIATRY & NEUROLOGY

## 2021-09-30 RX ORDER — BUPIVACAINE HYDROCHLORIDE 5 MG/ML
3 INJECTION, SOLUTION PERINEURAL ONCE
Status: COMPLETED | OUTPATIENT
Start: 2021-09-30 | End: 2021-09-30

## 2021-09-30 RX ORDER — LIDOCAINE HYDROCHLORIDE 20 MG/ML
3 INJECTION, SOLUTION INFILTRATION; PERINEURAL ONCE
Status: COMPLETED | OUTPATIENT
Start: 2021-09-30 | End: 2021-09-30

## 2021-09-30 RX ADMIN — LIDOCAINE HYDROCHLORIDE 3 ML: 20 INJECTION, SOLUTION INFILTRATION; PERINEURAL at 13:10

## 2021-09-30 RX ADMIN — BUPIVACAINE HYDROCHLORIDE 15 MG: 5 INJECTION, SOLUTION PERINEURAL at 13:10

## 2021-09-30 NOTE — PROGRESS NOTES
Trigger point injection    Mixture of 50/50 proportion of Lidocaine 2% and Bupivacaine 0.5% was applied as a subcutaneous injections to the trigger points in the area of 3 paraspinal muscles: Semispinalis capitis muscle, Splenius capitis and Trapezius muscles Rusing 0.5cc in each point, total of 6cc. Pain and stiffness before the procedure: 8/10  Pain and stiffness after the procedure: 3/10    Date/Time: 9/30/2021, 1 PM  Performed by: Elpidio BLAIR  Authorized by:ELVIS BLAIR  Consent: Verbal consent obtained. Written consent obtained.   Risks and benefits: risks, benefits and alternatives were discussed  Consent given by: patient  Patient understanding: patient states understanding of the procedure being performed  Body area: Upper back  Laterality: R  Sedation:  Patient sedated: no  Local Anesthetic: lidocaine 2% without epinephrine and bupivacaine 0.5% without epinephrine  Outcome: pain improved  Patient tolerance: Patient tolerated the procedure well with no immediate complications    Geraldo Bonilla MD  Neurology & Clinical Neurophysiology    [facility]  [address] [place] [address],  [address] [place] [address], [id number]  Office phone: [phone]

## 2021-10-18 RX ORDER — PROPRANOLOL HCL 60 MG
60 CAPSULE, EXTENDED RELEASE 24HR ORAL DAILY
Qty: 30 CAPSULE | Refills: 2 | Status: SHIPPED
Start: 2021-10-18 | End: 2021-10-28

## 2021-10-28 ENCOUNTER — OFFICE VISIT (OUTPATIENT)
Dept: NEUROLOGY | Age: 61
End: 2021-10-28
Payer: COMMERCIAL

## 2021-10-28 VITALS
BODY MASS INDEX: 26.52 KG/M2 | WEIGHT: 175 LBS | HEIGHT: 68 IN | OXYGEN SATURATION: 100 % | HEART RATE: 53 BPM | TEMPERATURE: 97.8 F | RESPIRATION RATE: 18 BRPM | SYSTOLIC BLOOD PRESSURE: 131 MMHG | DIASTOLIC BLOOD PRESSURE: 77 MMHG

## 2021-10-28 DIAGNOSIS — M54.12 CERVICAL RADICULOPATHY: ICD-10-CM

## 2021-10-28 DIAGNOSIS — R25.1 TREMOR: Primary | ICD-10-CM

## 2021-10-28 PROCEDURE — 1036F TOBACCO NON-USER: CPT | Performed by: CLINICAL NURSE SPECIALIST

## 2021-10-28 PROCEDURE — G8427 DOCREV CUR MEDS BY ELIG CLIN: HCPCS | Performed by: CLINICAL NURSE SPECIALIST

## 2021-10-28 PROCEDURE — G8419 CALC BMI OUT NRM PARAM NOF/U: HCPCS | Performed by: CLINICAL NURSE SPECIALIST

## 2021-10-28 PROCEDURE — 3017F COLORECTAL CA SCREEN DOC REV: CPT | Performed by: CLINICAL NURSE SPECIALIST

## 2021-10-28 PROCEDURE — G8484 FLU IMMUNIZE NO ADMIN: HCPCS | Performed by: CLINICAL NURSE SPECIALIST

## 2021-10-28 PROCEDURE — 99213 OFFICE O/P EST LOW 20 MIN: CPT | Performed by: CLINICAL NURSE SPECIALIST

## 2021-10-28 RX ORDER — ALENDRONATE SODIUM 70 MG/1
70 TABLET ORAL
COMMUNITY

## 2021-10-28 RX ORDER — PRIMIDONE 50 MG/1
TABLET ORAL
Qty: 90 TABLET | Refills: 3 | Status: SHIPPED
Start: 2021-10-28 | End: 2022-11-02

## 2021-10-28 NOTE — PROGRESS NOTES
Kait Snyder is a 64 y.o. right handed woman    Patient states she began noticing a tremor a few years ago. Primarily in her right hand but also noted in her left hand.   She definitely feels her right hand is more involved  Over the last few months it has worsened-especially after she contracted COVID-19    Right shoulder pain initially resolved with trigger point injections however after repeat injection she is unsure of its effectiveness  No weakness  No falls  She is not dropping things  She has now noted numbness in the right hand primarily in the lateral aspect of her right fifth digit   But still denies any weakness    She did order a heavy wrist bracelet which seems to help clam her tremor but is not 100% effective  Is also seem to be inconvenient being on her right hand  No trouble chewing or swallowing  Nothing seems to worsen her shaking - holding a glass or spoon does not aggravate it   No neck pains    No history of stroke or seizure    Her 27year old son has a severe tremor in both hands  Her mother also had a tremor  None of them have been evaluated by a specialist     we did obtain an MRI of her brain which was unremarkable    We then started Inderal 60 mg daily and she did report back a few weeks later stating that it helped somewhat but now not completely convinced   Did discuss other options    Objective:   /77 (Site: Right Upper Arm, Position: Sitting, Cuff Size: Medium Adult)   Pulse 53   Temp 97.8 °F (36.6 °C)   Resp 18   Ht 5' 8\" (1.727 m)   Wt 175 lb (79.4 kg)   SpO2 100%   BMI 26.61 kg/m²      General appearance: alert, appears stated age and cooperative  Head: Normocephalic, without obvious abnormality, atraumatic  Extremities: no cyanosis or edema  Pulses: 2+ and symmetric  Skin: no rashes or lesions      Tinel's present right hand    Mental Status: Alert, oriented to person place and year     Speech: clear  Language: appropriate     minimal if any mask-like facies today  No Myerson's sign     Cranial Nerves:  I: smell    II: visual acuity     II: visual fields Full    II: pupils THERESE   III,VII: ptosis None   III,IV,VI: extraocular muscles  EOMI without nystagmus    V: mastication Normal   V: facial light touch sensation  Normal   V,VII: corneal reflex  Present   VII: facial muscle function - upper     VII: facial muscle function - lower Normal   VIII: hearing Normal   IX: soft palate elevation  Normal   IX,X: gag reflex    XI: trapezius strength  5/5   XI: sternocleidomastoid strength 5/5   XI: neck extension strength  5/5   XII: tongue strength  Normal     Motor:  5/5 throughout  Normal bulk and tone     Minimal tremor with outstretched hands   However tremor noted bilaterally right did appear worse than left  Mild resting tremor appreciated in the right arm  No ataxic tremor    No cogwheel rigidity appreciated in right elbow today  No bradykinesia     Sensory:  LT normal  Vibration normal     Coordination:   FN, FFM and ROSAMARIA normal  HS normal    Gait:  Normal without bradykinesia   Swings arms during ambulation    DTR:   No reflexes    No Simeon's     Laboratory/Radiology:     CBC with Differential:    Lab Results   Component Value Date    WBC 5.6 09/25/2020    RBC 4.33 09/25/2020    HGB 13.2 09/25/2020    HCT 38.8 09/25/2020     09/25/2020    MCV 89.5 09/25/2020    MCH 30.6 09/25/2020    MCHC 34.1 09/25/2020    RDW 13.7 09/25/2020    SEGSPCT 50.8 09/25/2020    LYMPHOPCT 38.7 09/25/2020    MONOPCT 4.7 09/25/2020    BASOPCT 1.1 09/25/2020    MONOSABS 0.3 09/25/2020    LYMPHSABS 2.2 09/25/2020    EOSABS 0.3 09/25/2020    BASOSABS 0.1 09/25/2020     CMP:    Lab Results   Component Value Date     09/25/2020    K 3.7 09/25/2020     09/25/2020    CO2 25 09/25/2020    BUN 15 09/25/2020    CREATININE 0.7 09/25/2020    GFRAA >60 06/29/2019    AGRATIO 1.5 09/25/2020    LABGLOM 85 09/25/2020    GLUCOSE 87 09/25/2020    PROT 7.6 09/25/2020    LABALBU 4.6 09/25/2020 CALCIUM 9.3 09/25/2020    BILITOT 0.7 09/25/2020    ALKPHOS 61 09/25/2020    AST 19 09/25/2020    ALT 18 09/25/2020     TSH:    Lab Results   Component Value Date    TSH 3.71 09/25/2020     MRI Brain 2021  Unremarkable exam    I independently reviewed the lab studies today.      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    However some exam findings warrant further monitoring and investigation     She also displays a right trapezius spasms which is more than likely from her tremor rather than it causing her tremor    Also complains of right shoulder pain as well as numbness in the right hand in a C8 distribution   But she also had a right Tinel's    Plan:     EMG will be obtained to help differentiate a C8 radiculopathy versus carpal tunnel disease    We will stop her Inderal due to ineffectiveness and try on primidone low dosages to start    Return to office in 6 weeks for report back in 3 weeks depending on her response to primidone    LIVIA Fabian CNS  3:44 PM  10/28/2021

## 2021-11-02 ENCOUNTER — OFFICE VISIT (OUTPATIENT)
Dept: NEUROLOGY | Age: 61
End: 2021-11-02
Payer: COMMERCIAL

## 2021-11-02 VITALS
DIASTOLIC BLOOD PRESSURE: 70 MMHG | SYSTOLIC BLOOD PRESSURE: 120 MMHG | WEIGHT: 175 LBS | HEIGHT: 68 IN | BODY MASS INDEX: 26.52 KG/M2

## 2021-11-02 DIAGNOSIS — G56.03 BILATERAL CARPAL TUNNEL SYNDROME: Primary | ICD-10-CM

## 2021-11-02 DIAGNOSIS — G56.22 ULNAR NEUROPATHY OF LEFT UPPER EXTREMITY: ICD-10-CM

## 2021-11-02 DIAGNOSIS — M54.12 CERVICAL RADICULOPATHY: ICD-10-CM

## 2021-11-02 PROCEDURE — 95886 MUSC TEST DONE W/N TEST COMP: CPT | Performed by: PSYCHIATRY & NEUROLOGY

## 2021-11-02 PROCEDURE — 95912 NRV CNDJ TEST 11-12 STUDIES: CPT | Performed by: PSYCHIATRY & NEUROLOGY

## 2021-11-02 NOTE — PROGRESS NOTES
[address] [facility]  Electrodiagnostic Laboratory  *Accredited by the [address] [place] [address] with exemplary status  [address] [facility]  Phone: [phone]  Fax: [phone]    Referring Provider: LIVIA Cooper*  Primary Care Physician: Eather Severance, MD  Patient Name: Sterling Goldberg  Patient YOB: 1960  Gender: female  BMI: There is no height or weight on file to calculate BMI. There were no vitals taken for this visit. 11/2/2021    Description of clinical problem: Referred for upper extremity study, evaluation for rt. cervical C8 radiculopathy vs CTS. C/o numbness, tingling of rt. hand. Pain No   ; Numbness/tingling  Yes; Weakness  No       Please refer to Neurology office progress note of AMIRA Almonte, [address], 10/28/2021 for additional information if needed. c/o numbness in right hand, primarily lateral aspect of right fifth digit, denied weakness. History of tremors of hands. Positive Tinel's test right hand. Right trapezius muscle spasm, right shoulder pain. Brief physical exam:   Sensory deficit No; Weakness No; Atrophy  No; Reflex abnormality Yes  Motor exam shows 5/5 power of hand  strength, finger abduction/adduction, flexion/extension, biceps/triceps, deltoids, shoulder shrug and normal motor tone. DTRs: 1+ and symmetric. Positive right Tinel's test.  Negative Stewart's. No fasciculations. + Focal muscular atrophy noted of the right abductor pollicis brevis (thenar) muscle (I.e., patient reports prior trauma with piece of wood injury, excised). Sensory: Grossly intact subjectively to light touch and sharp stick testing throughout.     Study Limitations: None        Full Name: Leanne Coast Gender: Female  MRN: [id number] YOB: 1960      Visit Date: 11/2/2021 09:05  Age: 64 Years 9 Months Old  Examining Physician: Dr. Annia Cloud   Referring Physician: Rinku Hudson DNP   Technician: [facility] Handy Cheng   Height: 5 feet 8 inch  Weight: 175 lbs  Notes: C8 Cervical radiculopathy vs CTS      Motor NCS      Nerve / Sites Latency Amplitude Amp. 1-2 Distance Lat Diff Velocity Temp.    ms mV % cm ms m/s °C   R Median - APB      Wrist 4.06 10.6 100 8   31.5      Elbow 7.97 9.7 91.7 21 3.91 54 31.5   L Median - APB      Wrist 3.80 12.6 100 8   31.7      Elbow 7.60 10.3 82.3 21 3.80 55 31.7   R Ulnar - ADM      Wrist 2.50 8.6 100 8   31.6      B. Elbow 5.52 8.6 99.5 21 3.02 70 31.6      A. Elbow 6.98 8.4 98.1 10 1.46 69 31.6   L Ulnar - ADM      Wrist 2.29 9.4 100 8   31.6      B. Elbow 5.21 9.4 100 19 2.92 65 31.6      A. Elbow 6.51 9.4 100 10 1.30 77 31.6                   Sensory NCS      Nerve / Sites Onset Lat Peak Lat PP Amp Amp. 1-2 Distance Velocity Temp.    ms ms µV % cm m/s °C   R Median - Digit II (Antidromic)      Mid Palm 1.67 2.55 25.8 100 7 42 31.6      Wrist 3.13 4.22 25.0 116 14 45 31.5   L Median - Digit II (Antidromic)      Mid Palm 1.56 2.40 44.9 100 7 45 31.6      Wrist 3.07 4.06 35.6 89.6 14 46 31.6   R Ulnar - Digit V (Antidromic)      Wrist 3.18 3.91 5.2 100 14 44 31.5   L Ulnar - Digit V (Antidromic)      Wrist 3.23 4.32 33.8 100 14 43 31.7   R Radial - Anatomical  (Forearm)      Forearm 1.82 2.55 11.6 100 10 55 31.5   L Radial - Anatomical  (Forearm)      Forearm 2.08 2.86 16.9 100 10 48 31.6   L Dorsal ulnar cutaneous - Hand dorsum (Forearm)      Forearm 1.93 2.45 10.3 100 8 42 31.7                     F  Wave      Nerve F Lat M Lat F-M Lat    ms ms ms   R Median - APB 31.5 4.1 27.4   R Ulnar - ADM 33.5 3.2 30.3   L Median - APB 28.8 3.9 24.9   L Ulnar - ADM 28.0 2.9 25.1         EMG         EMG Summary Table     Spontaneous MUAP Recruitment   Muscle IA Fib PSW Fasc H.F. Amp Dur. PPP Pattern   R. First dorsal inteross Normal None None None None Giant 2+ None Decr   R. Adductor pollicis Normal None None None None Giant 2+ None Decr   R. Abductor pollicis brevis Normal None None None None Giant 2+ None Decr   R.  Flexor pollicis longus Normal None None None None Giant 2+ None Decr   R. Extensor digitorum communis Normal None None None None Normal Normal None Normal   R. Pronator teres Normal None None None None 1+ 2+ None Decr   R. Flexor carpi ulnaris Normal None None None None 1+ 1+ None Decr   R. Biceps brachii Normal None None None None Normal Normal None Normal   R. Triceps brachii Normal None None None None 1+ 1+ None Decr   R. Deltoid Normal None None None None Normal Normal None Normal   R. Cervical paraspinals (mid) Normal None None None None -- -- -- --   R. Cervical paraspinals (low) Normal None None None None -- -- -- --   L. First dorsal inteross Normal None None None None Giant 2+ None Decr   L. Adductor pollicis Normal None None None None 1+ 1+ None Decr   L. Abductor pollicis brevis Normal None None None None 2+ 2+ None Decr   L. Flexor pollicis longus Normal None None None None Normal Normal None Normal   L. Extensor digitorum communis Normal None None None None Normal Normal None Normal   L. Pronator teres Normal None None None None 1+ 1+ None Decr   L. Flexor carpi ulnaris Normal None None None None 1+ 1+ None Decr   L. Biceps brachii Normal None None None None Normal Normal None Normal   L. Triceps brachii Normal None None None None 1+ 1+ None Decr   L. Deltoid Normal None None None None Normal Normal None Normal       Summary of Findings:   Nerve conduction studies:   · The following nerve conduction studies were abnormal:   · The right and left median sensory nerve conductions recording from the second digits are prolonged in distal latency with normal amplitude. · The left ulnar sensory nerve conduction is prolonged in distal latency with normal amplitude. · The right ulnar F-wave response is prolonged in latency. · All other nerve conduction studies listed in the table above were normal in latency, amplitude and conduction velocity. Needle EMG:   · Needle EMG was performed using a concentric needle.   · The following abnormalities were seen on needle EMG: Enlarged motor unit potentials in duration and amplitude with decreased recruitment (loss of motor units) of a moderately severe degree of the right and left intrinsic hand muscles and primarily C7/8 myotomal distribution on the right and primarily C7/8 myotomal distribution on the left with mildmoderate chronic denervation. · The right low and mid-cervical paraspinal muscle groups show no increased insertional activity or fibrillation potentials as listed.  All other muscles tested, as listed in the table above demonstrated normal amplitude, duration, phases and recruitment and no active denervation signs were seen. Diagnostic Interpretation: This study was abnormal.   Electrodiagnosis: NCS/EMG examination performed extensively of the right and left upper extremities including the right low and mid-cervical paraspinal muscle groups show electrodiagnostic evidence for the followin. A chronic right and left median mononeuropathy at the wrist (I.e., carpal tunnel syndrome) with chronic denervation of a moderately severe degree. 2.  A chronic left ulnar neuropathy, nonlocalizable, but probably at or around the level of the elbow with chronic denervation of a mildmoderately severe degree. 3.  A chronic right and left cervical radiculopathy (I.e., primarily C7/C8 myotomal distribution) of moderately severe degree on the right and mild-moderately severe degree on the left. Clinical correlation is recommended. Addendum: Evaluation for cervical spinal stenosis, lower cervical radiculopathy with MR imaging is recommended. Previous Study: None known    Technologist: SC  Physician: Tom Eckert MD    Nerve conduction studies and electromyography were performed according to our laboratory policies and procedures which can be provided upon request. All abnormal values are identified in the table.  Laboratory normal values can also be provided upon request. Cc: Eva Mata MD

## 2021-11-03 DIAGNOSIS — M54.12 CERVICAL RADICULOPATHY: Primary | ICD-10-CM

## 2021-11-05 ENCOUNTER — TELEPHONE (OUTPATIENT)
Dept: NEUROLOGY | Age: 61
End: 2021-11-05

## 2021-11-05 NOTE — TELEPHONE ENCOUNTER
Spoke with patient who stated that Dr Jason Eisenberg  Did not give her the results. But reminded her that Sebastian Berrios did discuss that there could be a pinched nerve in her neck causing the numbness, And that the EMG did demonstrate that there is  possibility therefore Sebastian Berrios ordered an MRI. MRI cervical spine Approved [id number] from Nov 5 to Dec 20. Scheduled for Dec 1 , [facility] @ 7:15pm , test begins @ 8pm.  Notified pt of above.

## 2021-12-01 ENCOUNTER — HOSPITAL ENCOUNTER (OUTPATIENT)
Dept: MRI IMAGING | Age: 61
Discharge: HOME | End: 2021-12-03
Payer: COMMERCIAL

## 2021-12-01 DIAGNOSIS — M54.12 CERVICAL RADICULOPATHY: ICD-10-CM

## 2021-12-01 PROCEDURE — 72141 MRI NECK SPINE W/O DYE: CPT

## 2021-12-13 ENCOUNTER — VIRTUAL VISIT (OUTPATIENT)
Dept: NEUROLOGY | Age: 61
End: 2021-12-13
Payer: COMMERCIAL

## 2021-12-13 DIAGNOSIS — M54.12 CERVICAL RADICULOPATHY: Primary | ICD-10-CM

## 2021-12-13 PROCEDURE — 99442 PR PHYS/QHP TELEPHONE EVALUATION 11-20 MIN: CPT | Performed by: CLINICAL NURSE SPECIALIST

## 2021-12-13 NOTE — PROGRESS NOTES
Flynn Pritchett is a 64 y.o. right handed woman    evaluated via telephone on 12/13/2021. Consent:  She and/or health care decision maker is aware that that she may receive a bill for this telephone service, depending on her insurance coverage, and has provided verbal consent to proceed: Yes  I affirm this is a Patient Initiated Episode with an Established Patient who has not had a related appointment within my department in the past 7 days or scheduled within the next 24 hours. The patient's identity was verified at the start of the call. Others involved in call: Total Time: minutes: 11-20 minutes  Consent:  The patient and/or health care decision maker is aware that that he may receive a bill for this telephone service, depending on his insurance coverage, and has provided verbal consent to proceed: Yes  Patient advised regarding steps to help prevent the spread of COVID-19   SOURCE - https://omerFlyClipcohen.info/. html   1-Stay home except to get medical care  2-Clean your hands often for atleast 20 secnds, avoid touching: Avoid touching your eyes, nose, and mouth with unwashed hands. 3-Seek medical attention: Seek prompt medical attention if your illness is worsening (e.g., difficulty breathing). Call you doctor first.  3-Wear a facemask if you are sick   4-Cover your coughs and sneezes   Note: not billable if this call serves to triage the patient into an appointment for the relevant concern    Patient began noticing a tremor a few years ago. Primarily in her right hand but also noted in her left hand.   She definitely feels her right hand is more involved  Over the last few months it has worsened-especially after she contracted COVID-19    Right shoulder pain initially resolved with trigger point injections however after repeat injection she is unsure of its effectiveness  No weakness  No falls  She is not dropping things  She has now noted numbness but no weakness in the right hand primarily in the lateral aspect of her right fifth digit  She did order a heavy wrist bracelet which seems to help clam her tremor but is not 100% effective  Is also seem to be inconvenient being on her right hand  No trouble chewing or swallowing  Nothing seems to worsen her shaking - holding a glass or spoon does not aggravate it   No neck pains    Attained an EMG which did show bilateral carpal tunnel syndrome moderate to severe severity. Left ulnar neuropathy as well as bilateral cervical radiculopathy C7/C8.   MRI of her brain was obtained which showed mild spondylosis    No history of stroke or seizure    Her 27year old son has a severe tremor in both hands  Her mother also had a tremor  None of them have been evaluated by a specialist     We tried Inderal with minimal effectiveness and recently switch her to primidone with also minimal effectiveness    we did obtain an MRI of her brain which was unremarkable    Objective:     Mental Status: Alert, oriented to person place and year     Speech: clear without vocal quivering   Language: appropriate     Breathing seems unlabored     Laboratory/Radiology:     CBC with Differential:    Lab Results   Component Value Date    WBC 5.6 09/25/2020    RBC 4.33 09/25/2020    HGB 13.2 09/25/2020    HCT 38.8 09/25/2020     09/25/2020    MCV 89.5 09/25/2020    MCH 30.6 09/25/2020    MCHC 34.1 09/25/2020    RDW 13.7 09/25/2020    SEGSPCT 50.8 09/25/2020    LYMPHOPCT 38.7 09/25/2020    MONOPCT 4.7 09/25/2020    BASOPCT 1.1 09/25/2020    MONOSABS 0.3 09/25/2020    LYMPHSABS 2.2 09/25/2020    EOSABS 0.3 09/25/2020    BASOSABS 0.1 09/25/2020     CMP:    Lab Results   Component Value Date     09/25/2020    K 3.7 09/25/2020     09/25/2020    CO2 25 09/25/2020    BUN 15 09/25/2020    CREATININE 0.7 09/25/2020    GFRAA >60 06/29/2019    AGRATIO 1.5 09/25/2020    LABGLOM 85 09/25/2020    GLUCOSE 87 09/25/2020    PROT 7.6 09/25/2020    LABALBU 4.6 2020    CALCIUM 9.3 2020    BILITOT 0.7 2020    ALKPHOS 61 2020    AST 19 2020    ALT 18 2020     TSH:    Lab Results   Component Value Date    TSH 3.71 2020     MRI C-spine  Mild spondylosis. EMG 2021  This study was abnormal.   Electrodiagnosis: NCS/EMG examination performed extensively of the right and left upper extremities including the right low and mid-cervical paraspinal muscle groups show electrodiagnostic evidence for the followin. A chronic right and left median mononeuropathy at the wrist (I.e., carpal tunnel syndrome) with chronic denervation of a moderately severe degree. 2.  A chronic left ulnar neuropathy, nonlocalizable, but probably at or around the level of the elbow with chronic denervation of a mildmoderately severe degree. 3.  A chronic right and left cervical radiculopathy (I.e., primarily C7/C8 myotomal distribution) of moderately severe degree on the right and mild-moderately severe degree on the left. MRI Brain   Unremarkable exam    I independently reviewed the lab studies today.      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    However some exam findings warrant further monitoring and investigation     She also displays a right trapezius spasms which is more than likely from her tremor rather than it causing her tremor    Also complains of right shoulder pain as well as numbness in the right hand in a C7/C8 distribution which coincides with the EMG findings   But she also had a right Tinel's which CTS was proven on EMG findings    Plan:     I have her follow with neurosurgery regarding above EMG and clinical findings no MRI appears to be mild   She will most likely need physical therapy however will defer to neurosurgery for recommendations    Continue primidone for now   Consider titrating after NS appointment    LIVIA Burns - CNS  3:29 PM  12/13/2021

## 2022-03-24 ENCOUNTER — INITIAL CONSULT (OUTPATIENT)
Dept: NEUROSURGERY | Age: 62
End: 2022-03-24
Payer: COMMERCIAL

## 2022-03-24 VITALS
OXYGEN SATURATION: 99 % | TEMPERATURE: 98.2 F | HEIGHT: 68 IN | WEIGHT: 175 LBS | SYSTOLIC BLOOD PRESSURE: 136 MMHG | DIASTOLIC BLOOD PRESSURE: 74 MMHG | BODY MASS INDEX: 26.52 KG/M2 | RESPIRATION RATE: 20 BRPM | HEART RATE: 81 BPM

## 2022-03-24 DIAGNOSIS — M54.12 CERVICAL RADICULOPATHY: Primary | ICD-10-CM

## 2022-03-24 PROCEDURE — 99204 OFFICE O/P NEW MOD 45 MIN: CPT | Performed by: PHYSICIAN ASSISTANT

## 2022-03-24 PROCEDURE — G8484 FLU IMMUNIZE NO ADMIN: HCPCS | Performed by: PHYSICIAN ASSISTANT

## 2022-03-24 PROCEDURE — 1036F TOBACCO NON-USER: CPT | Performed by: PHYSICIAN ASSISTANT

## 2022-03-24 PROCEDURE — 3017F COLORECTAL CA SCREEN DOC REV: CPT | Performed by: PHYSICIAN ASSISTANT

## 2022-03-24 PROCEDURE — G8419 CALC BMI OUT NRM PARAM NOF/U: HCPCS | Performed by: PHYSICIAN ASSISTANT

## 2022-03-24 PROCEDURE — G8427 DOCREV CUR MEDS BY ELIG CLIN: HCPCS | Performed by: PHYSICIAN ASSISTANT

## 2022-03-24 ASSESSMENT — ENCOUNTER SYMPTOMS
EYES NEGATIVE: 1
ALLERGIC/IMMUNOLOGIC NEGATIVE: 1
GASTROINTESTINAL NEGATIVE: 1
RESPIRATORY NEGATIVE: 1

## 2022-03-24 NOTE — PROGRESS NOTES
Subjective:      Patient ID: Mi Solo is a 64 y.o. female. Neck Pain   This is a chronic problem. The current episode started more than 1 year ago. The problem occurs intermittently. The problem has been unchanged. The pain is associated with nothing. The pain is present in the midline (and right arm pain/numbness into 5th finger). The quality of the pain is described as aching. The pain is at a severity of 7/10. Treatments tried: tylenol. The treatment provided mild relief. Review of Systems   Constitutional: Negative. HENT: Negative. Eyes: Negative. Respiratory: Negative. Cardiovascular: Negative. Gastrointestinal: Negative. Endocrine: Negative. Genitourinary: Negative. Musculoskeletal: Positive for neck pain. Skin: Negative. Allergic/Immunologic: Negative. Neurological: Negative. Hematological: Negative. Psychiatric/Behavioral: Negative. Objective:   Physical Exam  Constitutional:       Appearance: Normal appearance. HENT:      Head: Normocephalic and atraumatic. Nose: Nose normal.   Eyes:      Pupils: Pupils are equal, round, and reactive to light. Pulmonary:      Effort: Pulmonary effort is normal.   Abdominal:      General: There is no distension. Skin:     General: Skin is warm and dry. Neurological:      Mental Status: She is alert. GCS: GCS eye subscore is 4. GCS verbal subscore is 5. GCS motor subscore is 6. Cranial Nerves: Cranial nerves are intact. Sensory: Sensation is intact. Motor: Motor function is intact. Gait: Gait is intact. Deep Tendon Reflexes: Reflexes are normal and symmetric. Psychiatric:         Mood and Affect: Mood normal.         Assessment:      64year old female with intermittent right arm pain for the past couple years. Cervical MRI reveals normal alignment, small C5-6 and C6-7 disc herniations. EMG suggests right C7/8 radiculopathies. Plan: We will order PT and KIT. No surgical intervention planned at this time.         Clora Goldberg, PA

## 2022-05-05 ENCOUNTER — PREP FOR PROCEDURE (OUTPATIENT)
Dept: PAIN MANAGEMENT | Age: 62
End: 2022-05-05

## 2022-05-05 ENCOUNTER — TELEPHONE (OUTPATIENT)
Dept: PAIN MANAGEMENT | Age: 62
End: 2022-05-05

## 2022-05-05 ENCOUNTER — OFFICE VISIT (OUTPATIENT)
Dept: PAIN MANAGEMENT | Age: 62
End: 2022-05-05
Payer: COMMERCIAL

## 2022-05-05 VITALS
HEART RATE: 88 BPM | DIASTOLIC BLOOD PRESSURE: 80 MMHG | WEIGHT: 175 LBS | HEIGHT: 68 IN | SYSTOLIC BLOOD PRESSURE: 130 MMHG | RESPIRATION RATE: 16 BRPM | OXYGEN SATURATION: 97 % | BODY MASS INDEX: 26.52 KG/M2 | TEMPERATURE: 97.3 F

## 2022-05-05 DIAGNOSIS — G89.4 CHRONIC PAIN SYNDROME: Primary | ICD-10-CM

## 2022-05-05 DIAGNOSIS — M54.12 CERVICAL RADICULOPATHY: Primary | ICD-10-CM

## 2022-05-05 DIAGNOSIS — M50.90 CERVICAL DISC DISORDER: ICD-10-CM

## 2022-05-05 DIAGNOSIS — M54.12 CERVICAL RADICULOPATHY: ICD-10-CM

## 2022-05-05 DIAGNOSIS — M54.2 CERVICALGIA: ICD-10-CM

## 2022-05-05 PROCEDURE — G8427 DOCREV CUR MEDS BY ELIG CLIN: HCPCS | Performed by: PAIN MEDICINE

## 2022-05-05 PROCEDURE — 1036F TOBACCO NON-USER: CPT | Performed by: PAIN MEDICINE

## 2022-05-05 PROCEDURE — 3017F COLORECTAL CA SCREEN DOC REV: CPT | Performed by: PAIN MEDICINE

## 2022-05-05 PROCEDURE — 99204 OFFICE O/P NEW MOD 45 MIN: CPT | Performed by: PAIN MEDICINE

## 2022-05-05 PROCEDURE — G8419 CALC BMI OUT NRM PARAM NOF/U: HCPCS | Performed by: PAIN MEDICINE

## 2022-05-05 RX ORDER — ASCORBIC ACID 500 MG
2000 TABLET ORAL DAILY
COMMUNITY

## 2022-05-05 NOTE — PROGRESS NOTES
Do you currently have any of the following:    Fever: No  Headache:  No  Cough: No  Shortness of breath: No  Exposed to anyone with these symptoms: No         Larissa To [facility] the [facility]. Jailene Beaulieu is complaining of pain right shoulder. The pain is intermittent. The pain is described as aching. Pain is rated on her best day at a 0, on her worst day at a 9, and on average at a 3 on the VAS scale. She took her last dose of Tylenol PRN. Any procedures since your last visit: No, with  % relief. Pacemaker or defibrillator: No managed by . She is not on NSAIDS and is not on anticoagulation medications to include none and is managed by . Medication Contract and Consent for Opioid Use Documents Filed      No documents found                /80   Pulse 88   Temp 97.3 °F (36.3 °C) (Infrared)   Resp 16   Ht 5' 8\" (1.727 m)   Wt 175 lb (79.4 kg)   SpO2 97%   BMI 26.61 kg/m²      No LMP recorded.

## 2022-05-05 NOTE — TELEPHONE ENCOUNTER
Call to Arturo Quiles that procedure was approved for 05.12.2022 and that the surgery center should call her a few days before for the pre op call and after 3:00 PM the business day before with the arrival time. Instructed Nancy to hold ibuprofen for 24 hours, naprosyn for 4 days and any aspirin containing products or fish oil for 7 days. Instructed to call office back if any questions. Nancy verbalized understanding.

## 2022-05-05 NOTE — PROGRESS NOTES
[facility] Pain Management        [facility] [address]  ARIS BROOKS [facility] - BEHAVIORAL HEALTH SERVICES, [address] [place] [address]  Dept: [phone]          Consult Note      Patient: SHANIKA Palacios 1960    Date of Service:  22     Requesting Physician:  MELVI Lawson    Reason for Consult:      Patient presents with complaints of right arm pain that started 2 years ago    HISTORY OF PRESENT ILLNESS:      Pain is intermittent and is described as aching. Pain does radiate to right arm. She  has numbness, tingling, weakness of the right arm and does not have bladder or bowel dysfunction. Alleviating factors include: putting arm up on head. Aggravating factors include:  overuse. She has not been on anticoagulation medications to include ASA, NSAIDS, Plavix, heparin, LMW heparin and warfarin and has not been on herbal supplements. She is not diabetic. Imagin/2021 EDX -   Diagnostic Interpretation:      This study was abnormal.   Electrodiagnosis: NCS/EMG examination performed extensively of the right and left upper extremities including the right low and mid-cervical paraspinal muscle groups show electrodiagnostic evidence for the followin. A chronic right and left median mononeuropathy at the wrist (I.e., carpal tunnel syndrome) with chronic denervation of a moderately severe degree. 2.  A chronic left ulnar neuropathy, nonlocalizable, but probably at or around the level of the elbow with chronic denervation of a mildmoderately severe degree.   3.  A chronic right and left cervical radiculopathy (I.e., primarily C7/C8 myotomal distribution) of moderately severe degree on the right and mild-moderately severe degree on the left.     Clinical correlation is recommended.     Addendum: Evaluation for cervical spinal stenosis, lower cervical radiculopathy with MR imaging is recommended.     Previous Study: None known     Technologist: SC  Physician: Mark Rowe MD    2021 MRI cervical -  FINDINGS: BONES/ALIGNMENT: There is normal alignment of the spine. The vertebral body   heights are maintained. The bone marrow signal appears unremarkable.       SPINAL CORD: No abnormal cord signal is seen.       SOFT TISSUES: No paraspinal mass identified.       C2-C3: There is no significant disc protrusion, spinal canal stenosis or   neural foraminal narrowing.       C3-C4: There is no significant disc protrusion, spinal canal stenosis or   neural foraminal narrowing.       C4-C5: Small disc osteophyte complex indents the ventral thecal sac.       C5-C6: Disc osteophyte complex and uncovertebral hypertrophy cause mild   thecal sac and mild right foraminal stenosis.       C6-C7: Disc osteophyte complex and uncovertebral hypertrophy cause mild   thecal sac and mild bilateral foraminal stenosis.       C7-T1: Facet hypertrophy without stenosis.         Impression   Mild spondylosis. Previous treatments: Physical Therapy and medications. Past Medical History:   Diagnosis Date    Nasal congestion     Osteopenia     Urge incontinence        Past Surgical History:   Procedure Laterality Date    ENDOMETRIAL ABLATION  11/2007    [facility] - Dr. Beltre Hawk Right 07/2014    TUBAL LIGATION  1992       Prior to Admission medications    Medication Sig Start Date End Date Taking?  Authorizing Provider   alendronate (FOSAMAX) 70 MG tablet Take 70 mg by mouth every 7 days   Yes Historical Provider, MD   primidone (MYSOLINE) 50 MG tablet 25-50mg daily 10/28/21  Yes LIVIA Sweeney - JOE   hydroCHLOROthiazide (HYDRODIURIL) 25 MG tablet  7/21/21  Yes Historical Provider, MD Serrano Abbgauri 500 MG CAPS Take by mouth   Yes Historical Provider, MD   Cinnamon 500 MG CAPS Take by mouth   Yes Historical Provider, MD   tolterodine (DETROL LA) 4 MG extended release capsule  7/23/19  Yes Historical Provider, MD marieelastine (ASTELIN) 0.1 % nasal spray 1 spray by Nasal route 2 times daily Use in each nostril as directed 8/7/19  Yes Render MD Brielle   CALCIUM PO Take 1,200 mg by mouth    Historical Provider, MD   VITAMIN D PO Take 1,600 mg by mouth    Historical Provider, MD   Aspirin Buf,CaCarb-MgCarb-MgO, 81 MG TABS Take 81 mg by mouth  Patient not taking: Reported on 5/5/2022    Historical Provider, MD       Allergies   Allergen Reactions    Penicillins        Social History     Socioeconomic History    Marital status:      Spouse name: Not on file    Number of children: Not on file    Years of education: Not on file    Highest education level: Not on file   Occupational History    Not on file   Tobacco Use    Smoking status: Never Smoker    Smokeless tobacco: Never Used   Vaping Use    Vaping Use: Never used   Substance and Sexual Activity    Alcohol use: Yes     Comment: Rarely    Drug use: Never    Sexual activity: Not on file   Other Topics Concern    Not on file   Social History Narrative    Not on file     Social Determinants of Health     Financial Resource Strain:     Difficulty of Paying Living Expenses: Not on file   Food Insecurity:     Worried About Running Out of Food in the Last Year: Not on file    Yvette of Food in the Last Year: Not on file   Transportation Needs:     Lack of Transportation (Medical): Not on file    Lack of Transportation (Non-Medical):  Not on file   Physical Activity:     Days of Exercise per Week: Not on file    Minutes of Exercise per Session: Not on file   Stress:     Feeling of Stress : Not on file   Social Connections:     Frequency of Communication with Friends and Family: Not on file    Frequency of Social Gatherings with Friends and Family: Not on file    Attends Roman Catholic Services: Not on file    Active Member of Clubs or Organizations: Not on file    Attends Club or Organization Meetings: Not on file    Marital Status: Not on file   Intimate Partner Violence:     Fear of Current or Ex-Partner: Not on file    Emotionally Abused: Not on file    Physically Abused: Not on file    Sexually Abused: Not on file   Housing Stability:     Unable to Pay for Housing in the Last Year: Not on file    Number of Places Lived in the Last Year: Not on file    Unstable Housing in the Last Year: Not on file       Family History   Problem Relation Age of Onset    Alzheimer's Disease Mother     High Blood Pressure Mother     Thyroid Disease Mother     COPD Father     Other Father         AAA    High Cholesterol Brother     Colon Polyps Brother     Other Paternal Aunt         AAA    Other Paternal Uncle         AAA       REVIEW OF SYSTEMS:     Patient specifically denies fever/chills, chest pain, shortness of breath, new bowel or bladder complaints. All other review of systems was negative. PHYSICAL EXAMINATION:      /80   Pulse 88   Temp 97.3 °F (36.3 °C) (Infrared)   Resp 16   Ht 5' 8\" (1.727 m)   Wt 175 lb (79.4 kg)   SpO2 97%   BMI 26.61 kg/m²     General:      General appearance:pleasant and well-hydrated, in no distress and A & O x3  Build:Normal Weight  Function:Rises from a seated position with difficulty    HEENT:    Head:normocephalic, atraumatic  Pupils:regular, round, equal  Sclera: icterus absent    Lungs:    Breathing:normal breathing pattern    Abdomen:    Shape:non-distended and normal  Tenderness:none  Guarding:none    Cervical spine:    Inspection:normal  Palpation:tenderness paravertebral muscles, tenderness trapezium, left, right positive. Range of motion:abnormal mildly flexion, extension rotation bilateral and is  painful.     Musculoskeletal:    Trigger points in trapezius:absent bilaterally  Trigger points in rhomboids:absent bilaterally  Trigger points in Paraveteral:absent bilaterally  Trigger points in supraspinatus/infraspinatus:absent  Spurling's:negative right, negative left    Simeon's:negative right, negative left Extremities:    Tremors:+ RUE  Range of motion:Generally normal shoulders  Intact:Yes  Varicose veins:absent   Pulses:present Lt radial  Cyanosis:none  Edema:none x all 4 extremities    Neurological:    Sensory:normal to light touch BUE    Motor:     Right Grip5/5              Left Grip5/5               Right Bicep5/5           Left Bicep5/5              Right Triceps5/5       Left Triceps5/5          Right Deltoid5/5     Left Deltoid5/5                    Reflexes:      Right Brachioradialis reflex2+  Left Brachioradialis reflex2+  Right Biceps reflex2+  Left Biceps reflex2+  Right Triceps reflex2+  Left Triceps reflex2+    Gait:normal    Dermatology:    Skin:no rashes or lesions noted    Impression:    RUE radicular pain for several years with intermittent N/T and tremor (follows with neurology)  2022 cervical MRI reveals normal alignment, small C5-6 and C6-7 disc herniations  2022 EMG suggests right C7/8 radiculopathies  Works as , 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, a mule, and a driving mini    Plan:    Reviewed referral documents and imaging  Urine screen deferred  OARRS report reviewed  Pt would like to avoid medication, briefly discussed membrane stabilizers  Currently enrolled in PT  C7-T1 KIT #1 - r/b and procedure discussed  Patient encouraged to stay active  Treatment plan discussed with the patient including medication and procedure side effects     CC:  Referring physician    LISETTE Alcocer.

## 2022-05-06 RX ORDER — DOCUSATE SODIUM 100 MG/1
100 CAPSULE, LIQUID FILLED ORAL 2 TIMES DAILY
COMMUNITY

## 2022-05-06 NOTE — PROGRESS NOTES
Have you been tested for COVID  Yes           Have you been told you were positive for COVID Yes 11/2020  Have you had any known exposure to someone that is positive for COVID No  Do you have a cough                   No              Do you have shortness of breath No                 Do you have a sore throat            No                Are you having chills                    No                Are you having muscle aches. No                    Please come to the hospital wearing a mask and have your significant other wear a mask as well. Both of you should check your temperature before leaving to come here,  if it is 100 or higher please call [phone] for instruction. [facility] PAIN MANAGEMENT  INSTRUCTIONS  . .......................................................................................................................................... [x] Parking the day of Surgery is located in the [facility].   Upon entering the door, make immediate right into the surgery reception room    [x]  Bring photo ID and insurance card     [x] You may have a light breakfast day of procedure    [x]  Wear loose comfortable clothing    [x]  Please follow instructions for medications as given per 's office    [x] You can expect a call the business day prior to procedure to notify you of your arrival time     [x] Please arrange for     []  Other instructions

## 2022-05-12 ENCOUNTER — HOSPITAL ENCOUNTER (OUTPATIENT)
Dept: GENERAL RADIOLOGY | Age: 62
Discharge: HOME | End: 2022-05-14
Attending: PAIN MEDICINE
Payer: COMMERCIAL

## 2022-05-12 ENCOUNTER — HOSPITAL ENCOUNTER (OUTPATIENT)
Age: 62
Setting detail: OUTPATIENT SURGERY
Discharge: HOME | End: 2022-05-12
Attending: PAIN MEDICINE | Admitting: PAIN MEDICINE
Payer: COMMERCIAL

## 2022-05-12 VITALS
SYSTOLIC BLOOD PRESSURE: 141 MMHG | OXYGEN SATURATION: 100 % | BODY MASS INDEX: 25.92 KG/M2 | DIASTOLIC BLOOD PRESSURE: 70 MMHG | TEMPERATURE: 98 F | WEIGHT: 175 LBS | HEIGHT: 69 IN | HEART RATE: 68 BPM | RESPIRATION RATE: 18 BRPM

## 2022-05-12 DIAGNOSIS — R52 PAIN MANAGEMENT: ICD-10-CM

## 2022-05-12 PROCEDURE — 3209999900 FLUORO FOR SURGICAL PROCEDURES

## 2022-05-12 PROCEDURE — 2709999900 HC NON-CHARGEABLE SUPPLY: Performed by: PAIN MEDICINE

## 2022-05-12 PROCEDURE — 2580000003 HC RX 258: Performed by: PAIN MEDICINE

## 2022-05-12 PROCEDURE — 7100000010 HC PHASE II RECOVERY - FIRST 15 MIN: Performed by: PAIN MEDICINE

## 2022-05-12 PROCEDURE — 62321 NJX INTERLAMINAR CRV/THRC: CPT | Performed by: PAIN MEDICINE

## 2022-05-12 PROCEDURE — A4216 STERILE WATER/SALINE, 10 ML: HCPCS | Performed by: PAIN MEDICINE

## 2022-05-12 PROCEDURE — 2500000003 HC RX 250 WO HCPCS: Performed by: PAIN MEDICINE

## 2022-05-12 PROCEDURE — 6360000004 HC RX CONTRAST MEDICATION: Performed by: PAIN MEDICINE

## 2022-05-12 PROCEDURE — 7100000011 HC PHASE II RECOVERY - ADDTL 15 MIN: Performed by: PAIN MEDICINE

## 2022-05-12 PROCEDURE — 3600000002 HC SURGERY LEVEL 2 BASE: Performed by: PAIN MEDICINE

## 2022-05-12 PROCEDURE — 6360000002 HC RX W HCPCS: Performed by: PAIN MEDICINE

## 2022-05-12 RX ORDER — SODIUM CHLORIDE 9 MG/ML
INJECTION INTRAVENOUS PRN
Status: DISCONTINUED | OUTPATIENT
Start: 2022-05-12 | End: 2022-05-12 | Stop reason: ALTCHOICE

## 2022-05-12 RX ORDER — METHYLPREDNISOLONE ACETATE 40 MG/ML
INJECTION, SUSPENSION INTRA-ARTICULAR; INTRALESIONAL; INTRAMUSCULAR; SOFT TISSUE PRN
Status: DISCONTINUED | OUTPATIENT
Start: 2022-05-12 | End: 2022-05-12 | Stop reason: ALTCHOICE

## 2022-05-12 RX ORDER — LIDOCAINE HYDROCHLORIDE 5 MG/ML
INJECTION, SOLUTION INFILTRATION; INTRAVENOUS PRN
Status: DISCONTINUED | OUTPATIENT
Start: 2022-05-12 | End: 2022-05-12 | Stop reason: ALTCHOICE

## 2022-05-12 ASSESSMENT — PAIN DESCRIPTION - DESCRIPTORS: DESCRIPTORS: ACHING;DISCOMFORT

## 2022-05-12 ASSESSMENT — PAIN SCALES - GENERAL: PAINLEVEL_OUTOF10: 0

## 2022-05-12 ASSESSMENT — PAIN - FUNCTIONAL ASSESSMENT: PAIN_FUNCTIONAL_ASSESSMENT: 0-10

## 2022-05-12 NOTE — H&P
AR[facility] BROO[facility] - BEHAVIORAL HEALTH SERVICES Pain Management        [address] [facility], [address]  Dept: [phone]    Procedure History & Physical      [place]     HPI:    Patient  is here for cervical and RUE pain for C7-T1 KIT  Labs/imaging studies reviewed   All question and concerns addressed including R/B/A associated with the procedure    Past Medical History:   Diagnosis Date    Nasal congestion     Osteopenia     Osteoporosis     Tremors of nervous system     Urge incontinence        Past Surgical History:   Procedure Laterality Date    ENDOMETRIAL ABLATION  11/2007    [facility] - Dr. Deedee Milner TEAR/HOLE Right 07/2014    TUBAL LIGATION  1992       Prior to Admission medications    Medication Sig Start Date End Date Taking?  Authorizing Provider   docusate sodium (COLACE) 100 MG capsule Take 100 mg by mouth 2 times daily   Yes Historical Provider, MD   CALCIUM PO Take 1,200 mg by mouth    Historical Provider, MD   VITAMIN D PO Take 1,600 mg by mouth    Historical Provider, MD   vitamin C (ASCORBIC ACID) 500 MG tablet Take 1,000 mg by mouth daily     Historical Provider, MD   alendronate (FOSAMAX) 70 MG tablet Take 70 mg by mouth every 7 days    Historical Provider, MD   primidone (MYSOLINE) 50 MG tablet 25-50mg daily 10/28/21   LIVIA Pineda - JOE   hydroCHLOROthiazide (HYDRODIURIL) 25 MG tablet  7/21/21   Historical Provider, MD Contreras Borja 500 MG CAPS Take by mouth    Historical Provider, MD   Cinnamon 500 MG CAPS Take 1,000 mg by mouth     Historical Provider, MD   tolterodine (DETROL LA) 4 MG extended release capsule  7/23/19   Historical Provider, MD   azelastine (ASTELIN) 0.1 % nasal spray 1 spray by Nasal route 2 times daily Use in each nostril as directed 8/7/19   Freda Betancourt MD       Allergies   Allergen Reactions    Penicillins        Social History     Socioeconomic History    Marital status:      Spouse name: Not on file    Number of children: Not on file    Years of education: Not on file    Highest education level: Not on file   Occupational History    Not on file   Tobacco Use    Smoking status: Never Smoker    Smokeless tobacco: Never Used   Vaping Use    Vaping Use: Never used   Substance and Sexual Activity    Alcohol use: Yes     Comment: Rarely    Drug use: Never    Sexual activity: Not on file   Other Topics Concern    Not on file   Social History Narrative    Not on file     Social Determinants of Health     Financial Resource Strain:     Difficulty of Paying Living Expenses: Not on file   Food Insecurity:     Worried About Running Out of Food in the Last Year: Not on file    Yvette of Food in the Last Year: Not on file   Transportation Needs:     Lack of Transportation (Medical): Not on file    Lack of Transportation (Non-Medical):  Not on file   Physical Activity:     Days of Exercise per Week: Not on file    Minutes of Exercise per Session: Not on file   Stress:     Feeling of Stress : Not on file   Social Connections:     Frequency of Communication with Friends and Family: Not on file    Frequency of Social Gatherings with Friends and Family: Not on file    Attends Anabaptism Services: Not on file    Active Member of [address] [place] [address] [employer] or Organizations: Not on file    Attends Club or Organization Meetings: Not on file    Marital Status: Not on file   Intimate Partner Violence:     Fear of Current or Ex-Partner: Not on file    Emotionally Abused: Not on file    Physically Abused: Not on file    Sexually Abused: Not on file   Housing Stability:     Unable to Pay for Housing in the Last Year: Not on file    Number of [place] in the Last Year: Not on file    Unstable Housing in the Last Year: Not on file       Family History   Problem Relation Age of Onset    Alzheimer's Disease Mother     High Blood Pressure Mother     Thyroid Disease Mother  COPD Father     Other Father         AAA    High Cholesterol Brother     Colon Polyps Brother     Other Paternal Aunt         AAA    Other Paternal Uncle         AAA    Cancer Maternal Grandmother     Cancer Maternal Grandfather     Diabetes Maternal Grandfather          REVIEW OF SYSTEMS:    CONSTITUTIONAL:  negative for  fevers, chills, sweats and fatigue    RESPIRATORY:  negative for  dry cough, cough with sputum, dyspnea, wheezing and chest pain    CARDIOVASCULAR:  negative for chest pain, dyspnea, palpitations, syncope    GASTROINTESTINAL:  negative for nausea, vomiting, change in bowel habits, diarrhea, constipation and abdominal pain    MUSCULOSKELETAL: negative for muscle weakness    SKIN: negative for itching or rashes. BEHAVIOR/PSYCH:  negative for poor appetite, increased appetite, decreased sleep and poor concentration    All other systems negative      PHYSICAL EXAM:    VITALS:  BP (!) 156/74   Pulse 69   Temp 97.8 °F (36.6 °C) (Temporal)   Resp 18   Ht 5' 8.5\" (1.74 m)   Wt 175 lb (79.4 kg)   SpO2 97%   BMI 26.22 kg/m²     CONSTITUTIONAL:  awake, alert, cooperative, no apparent distress, and appears stated age    EYES: PERRLA, EOMI    LUNGS:  No increased work of breathing, no audible wheezing    CARDIOVASCULAR:  regular rate and rhythm    ABDOMEN:  Soft non tender non distended     EXTREMITIES: no signs of clubbing or cyanosis. MUSCULOSKELETAL: negative for flaccid muscle tone or spastic movements. SKIN: gross examination reveals no signs of rashes, or diaphoresis. NEURO: Cranial nerves II-XII grossly intact. No signs of agitated mood.        Assessment/Plan:    Cervical and RUE pain for C7-T1 KIT

## 2022-05-12 NOTE — OP NOTE
2022    Patient: Arturo Quiles  :  1960  Age:  58 y.o. Sex:  female     PRE-PROCEDURE DIAGNOSIS: Cervical degenerative disc disease, cervical radicular pain. POST-PROCEDURE DIAGNOSIS: Same. PROCEDURE: Fluoroscopic guided cervical epidural steroid injection, #1 at C7-T1 level. SURGEON: LISETTE Redmond. ANESTHESIA: local    ESTIMATED BLOOD LOSS: None.  ______________________________________________________________________  BRIEF HISTORY:  Arturo Quiles comes in today for the first  therapeutic cervical epidural injection under fluoroscopic guidance. The potential complications of this procedure were discussed with the her again today. She has elected to undergo the aforementioned procedure. Geraldine complete History & Physical examination were reviewed in depth, a copy of which is in the chart. DESCRIPTION OF PROCEDURE:    After confirming written and informed consent, a time-out was performed and Geraldine name and date of birth, the procedure to be performed as well as the plan for the location of the needle insertion were confirmed. The patient was brought into the procedure room and placed in the prone position with the head flexed midline on the fluoroscopy table. A pillow was placed under the patient's chest and forehead to increase cervical interlaminar space. Standard monitors were placed, and vital signs were observed throughout the procedure. The area was prepped with chloraprep and the C7-T1 interspace was marked under fluoroscopy. The skin and subcutaneous tissues at the above level were anesthestized with 0.5% lidocaine. A # 18 gauge 3 1/2 tuohy epidural needle was inserted and advanced toward the inferior lamina until bony contact was made. The needle was then advanced superiorly toward the epidural space.  From this point on the loss of resistance technique with a 5 cc glass syringe was used to confirm entrance of the needle into the epidural space under intermittent lateral fluoroscopy. Once in the epidural space , negative aspiration for blood and CSF was confirmed . Epidural needle tip placement was confirmed by visualizing epidural spread of 2 ml of Omnipaque 240 in both live lateral and live AP fluoroscopic views. Then after negative aspiration, a solution of preservative free saline, 2 ml, and 40 mg DepoMedrol was easily injected. The needle was gently removed intact. The patient neck was cleaned and a Band-Aid was placed over the needle insertion point. Disposition the patient tolerated the procedure well and there were no complications . Vital signs remained stable throughout the procedure. The patient was escorted to the recovery area where they remained until discharge and written discharge instructions for the procedure were given. Plan: Luz Alvarez will return to our pain management center as scheduled.      Genoveva Gallegos, DO

## 2022-06-01 NOTE — PROGRESS NOTES
[facility]        [facility]  [facility], [address] [place] [address]  Dept: [phone]        Follow up Note      Sonia Hicks     Date of Visit:  22     CC:  Patient presents for follow up   Chief Complaint   Patient presents with    Follow Up After Procedure      Fluoroscopic guided cervical epidural steroid injection, #1 at C7-T1 level. HPI:    Pain is better. Change in quality of symptoms:no. Medication side effects:not applicable . Recent diagnostic testing:none. Recent interventional procedures:C7-T1 KIT #1 with 50% relief. She has not been on anticoagulation medications to include ASA, NSAIDS, Plavix, heparin, LMW heparin and warfarin and has not been on herbal supplements. She is not diabetic.     Imagin/2021 EDX -   Diagnostic Interpretation:      This study was abnormal.   Electrodiagnosis: NCS/EMG examination performed extensively of the right and left upper extremities including the right low and mid-cervical paraspinal muscle groups show electrodiagnostic evidence for the followin.  A chronic right and left median mononeuropathy at the wrist (I.e., carpal tunnel syndrome) with chronic denervation of a moderately severe degree. 2.  A chronic left ulnar neuropathy, nonlocalizable, but probably at or around the level of the elbow with chronic denervation of a mildmoderately severe degree. 3.  A chronic right and left cervical radiculopathy (I.e., primarily C7/C8 myotomal distribution) of moderately severe degree on the right and mild-moderately severe degree on the left.     Clinical correlation is recommended.     Addendum: Evaluation for cervical spinal stenosis, lower cervical radiculopathy with MR imaging is recommended.     Previous Study: None known     Azeem Ryan MD     2021 MRI cervical -  FINDINGS:   BONES/ALIGNMENT: There is normal alignment of the spine.  The vertebral body   heights are maintained. The bone marrow signal appears unremarkable.       SPINAL CORD: No abnormal cord signal is seen.       SOFT TISSUES: No paraspinal mass identified.       C2-C3: There is no significant disc protrusion, spinal canal stenosis or   neural foraminal narrowing.       C3-C4: There is no significant disc protrusion, spinal canal stenosis or   neural foraminal narrowing.       C4-C5: Small disc osteophyte complex indents the ventral thecal sac.       C5-C6: Disc osteophyte complex and uncovertebral hypertrophy cause mild   thecal sac and mild right foraminal stenosis.       C6-C7: Disc osteophyte complex and uncovertebral hypertrophy cause mild   thecal sac and mild bilateral foraminal stenosis.       C7-T1: Facet hypertrophy without stenosis.         Impression   Mild spondylosis.           Potential Aberrant Drug-Related Behavior:      Urine Drug Screening:    OARRS report:    Past Medical History:   Diagnosis Date    Nasal congestion     Osteopenia     Osteoporosis     Tremors of nervous system     Urge incontinence        Past Surgical History:   Procedure Laterality Date    ENDOMETRIAL ABLATION  11/2007    Isamar Dickerson CYST REMOVAL      PAIN MANAGEMENT PROCEDURE N/A 5/12/2022    C7-T1 EPIDURAL STEROID INJECTION #1 performed by Carla Shahid DO at [facility] Right 07/2014    TUBAL LIGATION  1992       Prior to Admission medications    Medication Sig Start Date End Date Taking?  Authorizing Provider   docusate sodium (COLACE) 100 MG capsule Take 100 mg by mouth 2 times daily   Yes Historical Provider, MD   CALCIUM PO Take 1,200 mg by mouth   Yes Historical Provider, MD   VITAMIN D PO Take 1,600 mg by mouth   Yes Historical Provider, MD   vitamin C (ASCORBIC ACID) 500 MG tablet Take 1,000 mg by mouth daily    Yes Historical Provider, MD   alendronate (FOSAMAX) 70 MG tablet Take 70 mg by mouth every 7 days   Yes Historical Provider, MD   primidone (MYSOLINE) 50 MG tablet 25-50mg daily 10/28/21  Yes LIVIA Lora   hydroCHLOROthiazide (HYDRODIURIL) 25 MG tablet  7/21/21  Yes Historical Provider, MD Arenas Imelda 500 MG CAPS Take by mouth   Yes Historical Provider, MD   Cinnamon 500 MG CAPS Take 1,000 mg by mouth    Yes Historical Provider, MD   tolterodine (DETROL LA) 4 MG extended release capsule  7/23/19  Yes Historical Provider, MD   azelastine (ASTELIN) 0.1 % nasal spray 1 spray by Nasal route 2 times daily Use in each nostril as directed 8/7/19  Yes Zechariah Shahid MD       Allergies   Allergen Reactions    Penicillins        Social History     Socioeconomic History    Marital status:      Spouse name: Not on file    Number of children: Not on file    Years of education: Not on file    Highest education level: Not on file   Occupational History    Not on file   Tobacco Use    Smoking status: Never Smoker    Smokeless tobacco: Never Used   Vaping Use    Vaping Use: Never used   Substance and Sexual Activity    Alcohol use: Yes     Comment: Rarely    Drug use: Never    Sexual activity: Not on file   Other Topics Concern    Not on file   Social History Narrative    Not on file     Social Determinants of Health     Financial Resource Strain:     Difficulty of Paying Living Expenses: Not on file   Food Insecurity:     Worried About [address] in the Last Year: Not on file    [address] in the Last Year: Not on file   Transportation Needs:     Lack of Transportation (Medical): Not on file    Lack of Transportation (Non-Medical):  Not on file   Physical Activity:     Days of Exercise per Week: Not on file    Minutes of Exercise per Session: Not on file   Stress:     Feeling of Stress : Not on file   Social Connections:     Frequency of Communication with Friends and Family: Not on file    Frequency of Social Gatherings with Friends and Family: Not on file    Attends [employer] Services: Not on file    Active Member of Clubs or Organizations: Not on file    Attends Club or Organization Meetings: Not on file    Marital Status: Not on file   Intimate Partner Violence:     Fear of Current or Ex-Partner: Not on file    Emotionally Abused: Not on file    Physically Abused: Not on file    Sexually Abused: Not on file   Housing Stability:     Unable to Pay for Housing in the Last Year: Not on file    Number of [place] in the Last Year: Not on file    Unstable Housing in the Last Year: Not on file       Family History   Problem Relation Age of Onset    Alzheimer's Disease Mother     High Blood Pressure Mother     Thyroid Disease Mother     COPD Father     Other Father         AAA    High Cholesterol Brother     Colon Polyps Brother     Other Paternal Aunt         AAA    Other Paternal Uncle         AAA    Cancer Maternal Grandmother     Cancer Maternal Grandfather     Diabetes Maternal Grandfather        REVIEW OF SYSTEMS:     Chika Grimes denies fever/chills, chest pain, shortness of breath, new bowel or bladder complaints. All other review of systems was negative. PHYSICAL EXAMINATION:      /85   Pulse 71   Temp 97.6 °F (36.4 °C) (Infrared)   Ht 5' 8.5\" (1.74 m)   Wt 175 lb (79.4 kg)   SpO2 98%   BMI 26.22 kg/m²     General:       General appearance:pleasant and well-hydrated, in no distress and A & O x3  Build:Normal Weight  Function:Rises from a seated position with difficulty     HEENT:     Head:normocephalic, atraumatic  Pupils:regular, round, equal  Sclera: icterus absent     Lungs:     Breathing:normal breathing pattern     Abdomen:     Shape:non-distended and normal  Tenderness:none  Guarding:none     Cervical spine:     Inspection:normal  Palpation:tenderness paravertebral muscles, tenderness trapezium, left, right positive.   Range of motion:abnormal mildly flexion, extension rotation bilateral and is painful.     Musculoskeletal:     Trigger points in trapezius:absent bilaterally  Trigger points in rhomboids:absent bilaterally  Trigger points in Paraveteral:absent bilaterally  Trigger points in supraspinatus/infraspinatus:absent  Spurling's:negative right, negative left    Simeon's:negative right, negative left      Extremities:     Tremors:+ RUE  Range of motion:Generally normal shoulders  Intact:Yes  Varicose veins:absent   Pulses:present Lt radial  Cyanosis:none  Edema:none x all 4 extremities     Neurological:     Sensory:normal to light touch BUE     Motor:      Right Grip5/5              Left Grip5/5               Right Bicep5/5           Left Bicep5/5              Right Triceps5/5       Left Triceps5/5          Right Deltoid5/5     Left Deltoid5/5                     Reflexes: (not assessed today)      Right Brachioradialis reflex2+  Left Brachioradialis reflex2+  Right Biceps reflex2+  Left Biceps reflex2+  Right Triceps reflex2+  Left Triceps reflex2+     Gait:normal     Dermatology:     Skin:no rashes or lesions noted     Impression:     RUE radicular pain for several years with intermittent N/T and tremor (follows with neurology)  2022 cervical MRI reveals normal alignment, small C5-6 and C6-7 disc herniations  2022 EMG suggests right C7/8 radiculopathies  Works as , 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

## 2022-06-07 ENCOUNTER — PREP FOR PROCEDURE (OUTPATIENT)
Dept: PAIN MANAGEMENT | Age: 62
End: 2022-06-07

## 2022-06-07 ENCOUNTER — OFFICE VISIT (OUTPATIENT)
Dept: PAIN MANAGEMENT | Age: 62
End: 2022-06-07
Payer: COMMERCIAL

## 2022-06-07 VITALS
BODY MASS INDEX: 25.92 KG/M2 | HEIGHT: 69 IN | HEART RATE: 71 BPM | WEIGHT: 175 LBS | SYSTOLIC BLOOD PRESSURE: 129 MMHG | DIASTOLIC BLOOD PRESSURE: 85 MMHG | OXYGEN SATURATION: 98 % | TEMPERATURE: 97.6 F

## 2022-06-07 DIAGNOSIS — G56.03 BILATERAL CARPAL TUNNEL SYNDROME: ICD-10-CM

## 2022-06-07 DIAGNOSIS — G89.4 CHRONIC PAIN SYNDROME: Primary | ICD-10-CM

## 2022-06-07 DIAGNOSIS — M54.2 CERVICALGIA: ICD-10-CM

## 2022-06-07 DIAGNOSIS — M54.12 CERVICAL RADICULOPATHY: ICD-10-CM

## 2022-06-07 DIAGNOSIS — M50.90 CERVICAL DISC DISORDER: ICD-10-CM

## 2022-06-07 DIAGNOSIS — M54.12 CERVICAL RADICULOPATHY: Primary | ICD-10-CM

## 2022-06-07 PROCEDURE — 3017F COLORECTAL CA SCREEN DOC REV: CPT | Performed by: PAIN MEDICINE

## 2022-06-07 PROCEDURE — 99213 OFFICE O/P EST LOW 20 MIN: CPT

## 2022-06-07 PROCEDURE — G8419 CALC BMI OUT NRM PARAM NOF/U: HCPCS | Performed by: PAIN MEDICINE

## 2022-06-07 PROCEDURE — G8427 DOCREV CUR MEDS BY ELIG CLIN: HCPCS | Performed by: PAIN MEDICINE

## 2022-06-07 PROCEDURE — 99213 OFFICE O/P EST LOW 20 MIN: CPT | Performed by: PAIN MEDICINE

## 2022-06-07 PROCEDURE — 1036F TOBACCO NON-USER: CPT | Performed by: PAIN MEDICINE

## 2022-06-07 NOTE — PROGRESS NOTES
Do you currently have any of the following:    Fever: No  Headache:  No  Cough: No  Shortness of breath: No  Exposed to anyone with these symptoms: No         Ezra Montgomery presents to the [facility] on 6/7/2022. Genevieve Wu is complaining of pain in neck. The pain is intermittent. The pain is described as aching and nagging. Pain is rated on her best day at a 0, on her worst day at a 10, and on average at a 2 on the VAS scale. She took her last dose of Tylenol lastnight. Any procedures since your last visit: Yes, with 40 % relief. Pacemaker or defibrillator: No.    She is not on NSAIDS and is not on anticoagulation medications to include none and is managed by none. Medication Contract and Consent for Opioid Use Documents Filed      No documents found                Temp 97.6 °F (36.4 °C) (Infrared)   Ht 5' 8.5\" (1.74 m)   Wt 175 lb (79.4 kg)   BMI 26.22 kg/m²      No LMP recorded.  Patient is postmenopausal.

## 2022-07-14 NOTE — PROGRESS NOTES
seen.       SOFT TISSUES: No paraspinal mass identified. C2-C3: There is no significant disc protrusion, spinal canal stenosis or   neural foraminal narrowing. C3-C4: There is no significant disc protrusion, spinal canal stenosis or   neural foraminal narrowing. C4-C5: Small disc osteophyte complex indents the ventral thecal sac. C5-C6: Disc osteophyte complex and uncovertebral hypertrophy cause mild   thecal sac and mild right foraminal stenosis. C6-C7: Disc osteophyte complex and uncovertebral hypertrophy cause mild   thecal sac and mild bilateral foraminal stenosis. C7-T1: Facet hypertrophy without stenosis. Impression   Mild spondylosis. Potential Aberrant Drug-Related Behavior:      Urine Drug Screening:    OARRS report:    Past Medical History:   Diagnosis Date    Nasal congestion     Osteopenia     Osteoporosis     Tremors of nervous system     Urge incontinence        Past Surgical History:   Procedure Laterality Date    ENDOMETRIAL ABLATION  11/2007    Isamar Triana CYST REMOVAL      PAIN MANAGEMENT PROCEDURE N/A 5/12/2022    C7-T1 EPIDURAL STEROID INJECTION #1 performed by Sabrina Kelly DO at [address] Right 07/2014    TUBAL LIGATION  1992       Prior to Admission medications    Medication Sig Start Date End Date Taking?  Authorizing Provider   docusate sodium (COLACE) 100 MG capsule Take 100 mg by mouth 2 times daily   Yes Historical Provider, MD   CALCIUM PO Take 1,200 mg by mouth   Yes Historical Provider, MD   VITAMIN D PO Take 1,600 mg by mouth   Yes Historical Provider, MD   vitamin C (ASCORBIC ACID) 500 MG tablet Take 1,000 mg by mouth daily    Yes Historical Provider, MD   alendronate (FOSAMAX) 70 MG tablet Take 70 mg by mouth every 7 days   Yes Historical Provider, MD   primidone (MYSOLINE) 50 MG tablet 25-50mg daily 10/28/21 Yes LIVIA Siegel   hydroCHLOROthiazide (HYDRODIURIL) 25 MG tablet  7/21/21  Yes Historical Provider, MD Pandya Abts 500 MG CAPS Take by mouth   Yes Historical Provider, MD   Cinnamon 500 MG CAPS Take 1,000 mg by mouth    Yes Historical Provider, MD   tolterodine (DETROL LA) 4 MG extended release capsule  7/23/19  Yes Historical Provider, MD   azelastine (ASTELIN) 0.1 % nasal spray 1 spray by Nasal route 2 times daily Use in each nostril as directed 8/7/19  Yes Charol Alpers, MD       Allergies   Allergen Reactions    Penicillins        Social History     Socioeconomic History    Marital status:      Spouse name: Not on file    Number of children: Not on file    Years of education: Not on file    Highest education level: Not on file   Occupational History    Not on file   Tobacco Use    Smoking status: Never    Smokeless tobacco: Never   Vaping Use    Vaping Use: Never used   Substance and Sexual Activity    Alcohol use: Yes     Comment: Rarely    Drug use: Never    Sexual activity: Not on file   Other Topics Concern    Not on file   Social History Narrative    Not on file     Social Determinants of Health     Financial Resource Strain: Not on file   Food Insecurity: Not on file   Transportation Needs: Not on file   Physical Activity: Not on file   Stress: Not on file   Social Connections: Not on file   Intimate Partner Violence: Not on file   Housing Stability: Not on file       Family History   Problem Relation Age of Onset    Alzheimer's Disease Mother     High Blood Pressure Mother     Thyroid Disease Mother     COPD Father     Other Father         AAA    High Cholesterol Brother     Colon Polyps Brother     Other Paternal Aunt         AAA    Other Paternal Uncle         AAA    Cancer Maternal Grandmother     Cancer Maternal Grandfather     Diabetes Maternal Grandfather        REVIEW OF SYSTEMS:     Celeste Officer denies fever/chills, chest pain, shortness of breath, new bowel or bladder complaints. All other review of systems was negative. PHYSICAL EXAMINATION:      BP (!) 152/86   Pulse 81   Temp 98 °F (36.7 °C) (Infrared)   Resp 16   Ht 5' 8\" (1.727 m)   Wt 173 lb (78.5 kg)   SpO2 98%   BMI 26.30 kg/m²     General:       General appearance:pleasant and well-hydrated, in no distress and A & O x3  Build:Normal Weight  Function:Rises from a seated position with difficulty     HEENT:     Head:normocephalic, atraumatic  Pupils:regular, round, equal  Sclera: icterus absent     Lungs:     Breathing:normal breathing pattern     Abdomen:     Shape:non-distended and normal  Tenderness:none  Guarding:none     Cervical spine:     Inspection:normal  Palpation:tenderness paravertebral muscles, tenderness trapezium, left, right negative  Range of motion:abnormal mildly flexion, extension rotation bilateral and is  painful.      Musculoskeletal:     Trigger points in trapezius:absent bilaterally  Trigger points in rhomboids:absent bilaterally  Trigger points in Paraveteral:absent bilaterally  Trigger points in supraspinatus/infraspinatus:absent  Spurling's:negative right, negative left    Simeon's:negative right, negative left      Extremities:     Tremors:+ RUE  Range of motion:Generally normal shoulders  Intact:Yes  Varicose veins:absent   Pulses:present Lt radial  Cyanosis:none  Edema:none x all 4 extremities     Neurological:     Sensory:normal to light touch BUE     Motor:      Right Grip5/5              Left Grip5/5               Right Bicep5/5           Left Bicep5/5              Right Triceps5/5       Left Triceps5/5          Right Deltoid5/5     Left Deltoid5/5                     Reflexes: (not assessed today)      Right Brachioradialis reflex2+  Left Brachioradialis reflex2+  Right Biceps reflex2+  Left Biceps reflex2+  Right Triceps reflex2+  Left Triceps reflex2+     Gait:normal     Dermatology:     Skin:no rashes or lesions noted     Impression:     RUE radicular pain for several years with intermittent N/T and tremor (follows with neurology)  2022 cervical MRI reveals normal alignment, small C5-6 and C6-7 disc herniations  2022 EMG suggests right C7/8 radiculopathies  Works as , 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, a mule, and a driving mini    Pain is currently controlled since KWABENA  She reports rt carpal tunnel symptoms also controlled with wrist splint  She had increased cervical pain when she did a 5-6 hour trail ride and it resolved with some stretching  She would like to repeat the injection prior to future upcoming long trail rides     Plan:    Rt wrist cock up splint is helpful  She will f/u with Jenny Wood for tx of tremors  Urine screen deferred  OARRS report reviewed  Pt would like to avoid medication, briefly discussed membrane stabilizers  Currently enrolled in PT  C7-T1 KIT #1 with 50% relief, pt would like to repeat just before a long trail ride (prep for case is in place) - r/b and procedure discussed  Patient encouraged to stay active  Treatment plan discussed with the patient including medication and procedure side effects     Cc:  Referring physician    LISETTE Reyes.

## 2022-07-19 ENCOUNTER — OFFICE VISIT (OUTPATIENT)
Dept: PAIN MANAGEMENT | Age: 62
End: 2022-07-19
Payer: COMMERCIAL

## 2022-07-19 VITALS
TEMPERATURE: 98 F | BODY MASS INDEX: 26.22 KG/M2 | DIASTOLIC BLOOD PRESSURE: 86 MMHG | WEIGHT: 173 LBS | HEIGHT: 68 IN | SYSTOLIC BLOOD PRESSURE: 152 MMHG | HEART RATE: 81 BPM | OXYGEN SATURATION: 98 % | RESPIRATION RATE: 16 BRPM

## 2022-07-19 DIAGNOSIS — G89.4 CHRONIC PAIN SYNDROME: Primary | ICD-10-CM

## 2022-07-19 DIAGNOSIS — G56.03 BILATERAL CARPAL TUNNEL SYNDROME: ICD-10-CM

## 2022-07-19 DIAGNOSIS — M54.2 CERVICALGIA: ICD-10-CM

## 2022-07-19 DIAGNOSIS — M50.90 CERVICAL DISC DISORDER: ICD-10-CM

## 2022-07-19 DIAGNOSIS — M54.12 CERVICAL RADICULOPATHY: ICD-10-CM

## 2022-07-19 PROCEDURE — 3017F COLORECTAL CA SCREEN DOC REV: CPT | Performed by: PAIN MEDICINE

## 2022-07-19 PROCEDURE — G8427 DOCREV CUR MEDS BY ELIG CLIN: HCPCS | Performed by: PAIN MEDICINE

## 2022-07-19 PROCEDURE — 1036F TOBACCO NON-USER: CPT | Performed by: PAIN MEDICINE

## 2022-07-19 PROCEDURE — G8419 CALC BMI OUT NRM PARAM NOF/U: HCPCS | Performed by: PAIN MEDICINE

## 2022-07-19 PROCEDURE — 99213 OFFICE O/P EST LOW 20 MIN: CPT | Performed by: PAIN MEDICINE

## 2022-07-19 NOTE — PROGRESS NOTES
Do you currently have any of the following:    Fever: No  Headache:  No  Cough: No  Shortness of breath: No  Exposed to anyone with these symptoms: No         Nany Mead presents to the [facility] on [facility]19/[facility]22. [facility] is complaining of pain in her right scapula. The pain is constant. The pain is described as aching, dull, sharp, and miserable. Pain is rated on her best day at a 1, on her worst day at a 8, and on average at a 5 on the VAS scale. Any procedures since your last visit: No    Pacemaker or defibrillator: No     She is not on NSAIDS and is not on anticoagulation medications. Medication Contract and Consent for Opioid Use Documents Filed        No documents found                    BP (!) 152/86   Pulse 81   Temp 98 °F (36.7 °C) (Infrared)   Resp 16   Ht 5' 8\" (1.727 m)   Wt 173 lb (78.5 kg)   SpO2 98%   BMI 26.30 kg/m²      No LMP recorded.  Patient is postmenopausal.

## 2022-08-04 ENCOUNTER — TELEPHONE (OUTPATIENT)
Dept: ADMINISTRATIVE | Age: 62
End: 2022-08-04

## 2022-08-04 NOTE — TELEPHONE ENCOUNTER
Patient called for an appointment w/Joe at the [address] office -- the medication is no longer working. Unable to schedule within requested timeframe. Please call her with appt at [phone].

## 2022-09-07 ENCOUNTER — OFFICE VISIT (OUTPATIENT)
Dept: NEUROLOGY | Age: 62
End: 2022-09-07
Payer: COMMERCIAL

## 2022-09-07 VITALS
OXYGEN SATURATION: 99 % | DIASTOLIC BLOOD PRESSURE: 77 MMHG | WEIGHT: 173 LBS | HEART RATE: 89 BPM | TEMPERATURE: 98.2 F | BODY MASS INDEX: 26.3 KG/M2 | SYSTOLIC BLOOD PRESSURE: 131 MMHG

## 2022-09-07 DIAGNOSIS — R25.1 TREMOR: Primary | ICD-10-CM

## 2022-09-07 PROCEDURE — 99214 OFFICE O/P EST MOD 30 MIN: CPT | Performed by: CLINICAL NURSE SPECIALIST

## 2022-09-07 PROCEDURE — G8427 DOCREV CUR MEDS BY ELIG CLIN: HCPCS | Performed by: CLINICAL NURSE SPECIALIST

## 2022-09-07 PROCEDURE — 3017F COLORECTAL CA SCREEN DOC REV: CPT | Performed by: CLINICAL NURSE SPECIALIST

## 2022-09-07 PROCEDURE — 1036F TOBACCO NON-USER: CPT | Performed by: CLINICAL NURSE SPECIALIST

## 2022-09-07 PROCEDURE — G8419 CALC BMI OUT NRM PARAM NOF/U: HCPCS | Performed by: CLINICAL NURSE SPECIALIST

## 2022-09-07 NOTE — PROGRESS NOTES
edema  Pulses: 2+ and symmetric  Skin: no rashes or lesions      Tinel's present right hand    Mental Status: Alert, oriented to person place and year     Speech: clear  Language: appropriate     No mask-like facies today  No Myerson's sign     Cranial Nerves:  I: smell    II: visual acuity     II: visual fields Full    II: pupils THERESE   III,VII: ptosis None   III,IV,VI: extraocular muscles  EOMI without nystagmus    V: mastication Normal   V: facial light touch sensation  Normal   V,VII: corneal reflex  Present   VII: facial muscle function - upper     VII: facial muscle function - lower Normal   VIII: hearing Normal   IX: soft palate elevation  Normal   IX,X: gag reflex    XI: trapezius strength  5/5   XI: sternocleidomastoid strength 5/5   XI: neck extension strength  5/5   XII: tongue strength  Normal     Motor:  5/5 throughout  Normal bulk and tone     Minimal tremor with outstretched hands  Mild resting tremor appreciated in the right arm  No ataxic tremor    cogwheel rigidity appreciated in both elbows and right wrist today  No bradykinesia     Sensory:  LT normal  Vibration normal     Coordination:   FN, FFM and ROSAMARIA normal  HS normal    Gait:  Normal without bradykinesia   Swings arms during ambulation    DTR:   No reflexes    No Simeon's     Laboratory/Radiology:     CBC with Differential:    Lab Results   Component Value Date/Time    WBC 5.6 09/25/2020 08:12 AM    RBC 4.33 09/25/2020 08:12 AM    HGB 13.2 09/25/2020 08:12 AM    HCT 38.8 09/25/2020 08:12 AM     09/25/2020 08:12 AM    MCV 89.5 09/25/2020 08:12 AM    MCH 30.6 09/25/2020 08:12 AM    MCHC 34.1 09/25/2020 08:12 AM    RDW 13.7 09/25/2020 08:12 AM    SEGSPCT 50.8 09/25/2020 08:12 AM    LYMPHOPCT 38.7 09/25/2020 08:12 AM    MONOPCT 4.7 09/25/2020 08:12 AM    BASOPCT 1.1 09/25/2020 08:12 AM    MONOSABS 0.3 09/25/2020 08:12 AM    LYMPHSABS 2.2 09/25/2020 08:12 AM    EOSABS 0.3 09/25/2020 08:12 AM    BASOSABS 0.1 09/25/2020 08:12 AM     CMP: Lab Results   Component Value Date/Time     2020 08:12 AM    K 3.7 2020 08:12 AM     2020 08:12 AM    CO2 25 2020 08:12 AM    BUN 15 2020 08:12 AM    CREATININE 0.7 2020 08:12 AM    GFRAA >60 2019 09:57 AM    AGRATIO 1.5 2020 08:12 AM    LABGLOM 85 2020 08:12 AM    GLUCOSE 87 2020 08:12 AM    PROT 7.6 2020 08:12 AM    LABALBU 4.6 2020 08:12 AM    CALCIUM 9.3 2020 08:12 AM    BILITOT 0.7 2020 08:12 AM    ALKPHOS 61 2020 08:12 AM    AST 19 2020 08:12 AM    ALT 18 2020 08:12 AM     TSH:    Lab Results   Component Value Date/Time    TSH 3.71 2020 08:12 AM     MRI C-spine  Mild spondylosis. EMG 2021  This study was abnormal.   Electrodiagnosis: NCS/EMG examination performed extensively of the right and left upper extremities including the right low and mid-cervical paraspinal muscle groups show electrodiagnostic evidence for the followin. A chronic right and left median mononeuropathy at the wrist (I.e., carpal tunnel syndrome) with chronic denervation of a moderately severe degree. 2.  A chronic left ulnar neuropathy, nonlocalizable, but probably at or around the level of the elbow with chronic denervation of a mild-moderately severe degree. 3.  A chronic right and left cervical radiculopathy (I.e., primarily C7/C8 myotomal distribution) of moderately severe degree on the right and mild-moderately severe degree on the left. MRI Brain   Unremarkable exam    I independently reviewed the lab studies today.      Assessment:     Patient displays a minimal tremor with outstretched hands -- I do note worsening in right arm compared to left   she does display a mild resting tremor in the right hand --I also appreciate cogwheel rigidity today in both arms   I see no Myerson's sign and her blinking seems normal --I also do not appreciate any masslike facies    Her son as well as her mother both suffer from similar tremors however patient has not responded to Inderal or primidone trials    She also displays a right trapezius spasms which is more than likely from her tremor rather than it causing her tremor    Also complains of right shoulder pain as well as numbness in the right hand in a C7/C8 distribution which coincides with the EMG findings   But she also had a right Tinel's which CTS was proven on EMG findings    Plan:      We will hold primidone at this time    We will try samples of Neupro patch and report back in 2 weeks    [place] Rail, APRN - CNS  9:01 AM  9/7/2022

## 2022-09-08 ENCOUNTER — TELEPHONE (OUTPATIENT)
Dept: PAIN MANAGEMENT | Age: 62
End: 2022-09-08

## 2022-09-08 ENCOUNTER — PREP FOR PROCEDURE (OUTPATIENT)
Dept: PAIN MANAGEMENT | Age: 62
End: 2022-09-08

## 2022-09-08 NOTE — TELEPHONE ENCOUNTER
Jose Baker called in and she wants to schedule her other procedure before she leaves for her trip on October 1st. Wanted to check about the order, and told her I would call her back. Please advise. Thanks.

## 2022-09-14 ENCOUNTER — TELEPHONE (OUTPATIENT)
Dept: PAIN MANAGEMENT | Age: 62
End: 2022-09-14

## 2022-09-14 NOTE — TELEPHONE ENCOUNTER
Call to Nahomi Markham that procedure was approved for 9/22/2022 and that the surgery center should call her a few days before for the pre op call and after 3:00 PM the business day before with the arrival time. Instructed Nancy to hold ibuprofen for 24 hours, naprosyn for 4 days and any aspirin containing products or fish oil for 7 days. Instructed to call office back if any questions. Nancy verbalized understanding.      Joseph Cruz RN  Pain Management

## 2022-09-16 NOTE — PROGRESS NOTES
[facility]  INSTRUCTIONS  . .......................................................................................................................................... [x] Parking the day of Surgery is located in the [facility].   Upon entering the door, make immediate right into the surgery reception room    [x]  Bring photo ID and insurance card     [x] You may have a light breakfast day of procedure    [x]  Wear loose comfortable clothing    [x]  Please follow instructions for medications as given per Dr's office    [x] You can expect a call the business day prior to procedure to notify you of your arrival time     [x] Please arrange for     []  Other instructions

## 2022-09-19 ENCOUNTER — TELEPHONE (OUTPATIENT)
Dept: NEUROLOGY | Age: 62
End: 2022-09-19

## 2022-09-19 NOTE — TELEPHONE ENCOUNTER
Patient called in today. She states that the Neupro patch is not working and that Porsche Putnam said they she could go back Primidone at a higher dose.   Electronically signed by Rizwana Keller MA on 9/19/22 at 9:45 AM EDT

## 2022-09-20 NOTE — TELEPHONE ENCOUNTER
MA notified patient of Joe's response. She will  Neupro 4 mg samples at [facility] office.   Electronically signed by Rudolph Cash MA on 9/20/2022 at 8:34 AM

## 2022-09-22 ENCOUNTER — HOSPITAL ENCOUNTER (OUTPATIENT)
Age: 62
Setting detail: OUTPATIENT SURGERY
Discharge: HOME | End: 2022-09-22
Attending: PAIN MEDICINE | Admitting: PAIN MEDICINE
Payer: COMMERCIAL

## 2022-09-22 ENCOUNTER — HOSPITAL ENCOUNTER (OUTPATIENT)
Dept: GENERAL RADIOLOGY | Age: 62
Setting detail: OUTPATIENT SURGERY
Discharge: HOME | End: 2022-09-24
Attending: PAIN MEDICINE
Payer: COMMERCIAL

## 2022-09-22 VITALS
RESPIRATION RATE: 18 BRPM | HEART RATE: 68 BPM | DIASTOLIC BLOOD PRESSURE: 72 MMHG | OXYGEN SATURATION: 100 % | HEIGHT: 69 IN | WEIGHT: 174 LBS | TEMPERATURE: 96.8 F | SYSTOLIC BLOOD PRESSURE: 147 MMHG | BODY MASS INDEX: 25.77 KG/M2

## 2022-09-22 DIAGNOSIS — R52 PAIN MANAGEMENT: ICD-10-CM

## 2022-09-22 PROCEDURE — 2500000003 HC RX 250 WO HCPCS: Performed by: PAIN MEDICINE

## 2022-09-22 PROCEDURE — 2709999900 HC NON-CHARGEABLE SUPPLY: Performed by: PAIN MEDICINE

## 2022-09-22 PROCEDURE — A4216 STERILE WATER/SALINE, 10 ML: HCPCS | Performed by: PAIN MEDICINE

## 2022-09-22 PROCEDURE — 3600000002 HC SURGERY LEVEL 2 BASE: Performed by: PAIN MEDICINE

## 2022-09-22 PROCEDURE — 7100000010 HC PHASE II RECOVERY - FIRST 15 MIN: Performed by: PAIN MEDICINE

## 2022-09-22 PROCEDURE — 6360000004 HC RX CONTRAST MEDICATION: Performed by: PAIN MEDICINE

## 2022-09-22 PROCEDURE — 2580000003 HC RX 258: Performed by: PAIN MEDICINE

## 2022-09-22 PROCEDURE — 3209999900 FLUORO FOR SURGICAL PROCEDURES

## 2022-09-22 PROCEDURE — 7100000011 HC PHASE II RECOVERY - ADDTL 15 MIN: Performed by: PAIN MEDICINE

## 2022-09-22 PROCEDURE — 6360000002 HC RX W HCPCS: Performed by: PAIN MEDICINE

## 2022-09-22 PROCEDURE — 62321 NJX INTERLAMINAR CRV/THRC: CPT | Performed by: PAIN MEDICINE

## 2022-09-22 RX ORDER — ROTIGOTINE 4 MG/24H
1 PATCH, EXTENDED RELEASE TRANSDERMAL DAILY
COMMUNITY
End: 2022-09-28

## 2022-09-22 RX ORDER — METHYLPREDNISOLONE ACETATE 40 MG/ML
INJECTION, SUSPENSION INTRA-ARTICULAR; INTRALESIONAL; INTRAMUSCULAR; SOFT TISSUE PRN
Status: DISCONTINUED | OUTPATIENT
Start: 2022-09-22 | End: 2022-09-22 | Stop reason: ALTCHOICE

## 2022-09-22 RX ORDER — LIDOCAINE HYDROCHLORIDE 5 MG/ML
INJECTION, SOLUTION INFILTRATION; INTRAVENOUS PRN
Status: DISCONTINUED | OUTPATIENT
Start: 2022-09-22 | End: 2022-09-22 | Stop reason: ALTCHOICE

## 2022-09-22 RX ORDER — SODIUM CHLORIDE 9 MG/ML
INJECTION INTRAVENOUS PRN
Status: DISCONTINUED | OUTPATIENT
Start: 2022-09-22 | End: 2022-09-22 | Stop reason: ALTCHOICE

## 2022-09-22 ASSESSMENT — PAIN DESCRIPTION - DESCRIPTORS
DESCRIPTORS: ACHING

## 2022-09-22 ASSESSMENT — PAIN - FUNCTIONAL ASSESSMENT
PAIN_FUNCTIONAL_ASSESSMENT: ACTIVITIES ARE NOT PREVENTED
PAIN_FUNCTIONAL_ASSESSMENT: 0-10
PAIN_FUNCTIONAL_ASSESSMENT: ACTIVITIES ARE NOT PREVENTED
PAIN_FUNCTIONAL_ASSESSMENT: ACTIVITIES ARE NOT PREVENTED

## 2022-09-22 ASSESSMENT — PAIN DESCRIPTION - PAIN TYPE: TYPE: CHRONIC PAIN

## 2022-09-22 ASSESSMENT — PAIN DESCRIPTION - LOCATION
LOCATION: NECK

## 2022-09-22 ASSESSMENT — PAIN DESCRIPTION - FREQUENCY
FREQUENCY: CONTINUOUS
FREQUENCY: CONTINUOUS

## 2022-09-22 ASSESSMENT — PAIN SCALES - GENERAL
PAINLEVEL_OUTOF10: 0
PAINLEVEL_OUTOF10: 1
PAINLEVEL_OUTOF10: 1

## 2022-09-22 NOTE — OP NOTE
lateral fluoroscopy. Once in the epidural space , negative aspiration for blood and CSF was confirmed . Epidural needle tip placement was confirmed by visualizing epidural spread of 2 ml of Isovue-M 300 in both live lateral and live AP fluoroscopic views. Then after negative aspiration, a solution of preservative free saline, 2 ml, and 40 mg DepoMedrol was easily injected. The needle was gently removed intact. The patient neck was cleaned and a Band-Aid was placed over the needle insertion point. Disposition the patient tolerated the procedure well and there were no complications . Vital signs remained stable throughout the procedure. The patient was escorted to the recovery area where they remained until discharge and written discharge instructions for the procedure were given. Plan: Queenie Olea will return to our pain management center as scheduled.      Henrique July, DO

## 2022-09-22 NOTE — H&P
[facility] Pain Management        [address] [facility], [address]  Dept: [phone]    Procedure History & Physical      Iona Keen     HPI:    Patient  is here for cervical pain for C7-T1 KIT  Labs/imaging studies reviewed   All question and concerns addressed including R/B/A associated with the procedure    Past Medical History:   Diagnosis Date    Cervical pain     Osteopenia     Osteoporosis     Tremors of nervous system     Urge incontinence        Past Surgical History:   Procedure Laterality Date    COLONOSCOPY      ENDOMETRIAL ABLATION  11/2007    Isamar Rosas N/A 05/12/2022    C7-T1 EPIDURAL STEROID INJECTION #1 performed by Roman Tomlinson DO at [address] Right 07/2014    [address] [place] [address] 83       Prior to Admission medications    Medication Sig Start Date End Date Taking?  Authorizing Provider   rotigotine (NEUPRO) 4 MG/24HR Place 1 patch onto the skin daily   Yes Historical Provider, MD   docusate sodium (COLACE) 100 MG capsule Take 100 mg by mouth 2 times daily    Historical Provider, MD   CALCIUM PO Take 1,200 mg by mouth    Historical Provider, MD   VITAMIN D PO Take 1,600 mg by mouth daily    Historical Provider, MD   vitamin C (ASCORBIC ACID) 500 MG tablet Take 2,000 mg by mouth daily    Historical Provider, MD   alendronate (FOSAMAX) 70 MG tablet Take 70 mg by mouth every 7 days    Historical Provider, MD   primidone (MYSOLINE) 50 MG tablet 25-50mg daily  Patient not taking: No sig reported 10/28/21   Albin Crigler, APRN - CNS   hydroCHLOROthiazide (HYDRODIURIL) 25 MG tablet Take 25 mg by mouth as needed 7/21/21   Historical Provider, MD Ochoa Dove 500 MG CAPS Take by mouth daily    Historical Provider, MD   Cinnamon 500 MG CAPS Take 1,000 mg by mouth     Historical Provider, MD   tolterodine (DETROL LA) 4 MG extended release capsule Take 4 mg by mouth daily 7/23/19   Historical Provider, MD   azelastine (ASTELIN) 0.1 % nasal spray 1 spray by Nasal route 2 times daily Use in each nostril as directed 8/7/19   Jeremías Murray MD       Allergies   Allergen Reactions    Penicillins Hives       Social History     Socioeconomic History    Marital status:      Spouse name: Not on file    Number of children: Not on file    Years of education: Not on file    Highest education level: Not on file   Occupational History    Not on file   Tobacco Use    Smoking status: Never    Smokeless tobacco: Never   Vaping Use    Vaping Use: Never used   Substance and Sexual Activity    Alcohol use: Yes     Comment: Rarely    Drug use: Never    Sexual activity: Not on file   Other Topics Concern    Not on file   Social History Narrative    Not on file     Social Determinants of Health     Financial Resource Strain: Not on file   Food Insecurity: Not on file   Transportation Needs: Not on file   Physical Activity: Not on file   Stress: Not on file   Social Connections: Not on file   Intimate Partner Violence: Not on file   Housing Stability: Not on file       Family History   Problem Relation Age of Onset    Alzheimer's Disease Mother     High Blood Pressure Mother     Thyroid Disease Mother     COPD Father     Other Father         AAA    High Cholesterol Brother     Colon Polyps Brother     Other Paternal Aunt         AAA    Other Paternal Uncle         AAA    Cancer Maternal Grandmother     Cancer Maternal Grandfather     Diabetes Maternal Grandfather          REVIEW OF SYSTEMS:    CONSTITUTIONAL:  negative for  fevers, chills, sweats and fatigue    RESPIRATORY:  negative for  dry cough, cough with sputum, dyspnea, wheezing and chest pain    CARDIOVASCULAR:  negative for chest pain, dyspnea, palpitations, syncope    GASTROINTESTINAL:  negative for nausea, vomiting, change in bowel habits, diarrhea, constipation and abdominal pain    MUSCULOSKELETAL: negative for muscle weakness    SKIN: negative for itching or rashes. BEHAVIOR/PSYCH:  negative for poor appetite, increased appetite, decreased sleep and poor concentration    All other systems negative      PHYSICAL EXAM:    VITALS:  /65   Pulse 68   Resp 16   Ht 5' 8.5\" (1.74 m)   Wt 174 lb (78.9 kg)   SpO2 98%   BMI 26.07 kg/m²     CONSTITUTIONAL:  awake, alert, cooperative, no apparent distress, and appears stated age    EYES: PERRLA, EOMI    LUNGS:  No increased work of breathing, no audible wheezing    CARDIOVASCULAR:  regular rate and rhythm    ABDOMEN:  Soft non tender non distended     EXTREMITIES: no signs of clubbing or cyanosis. MUSCULOSKELETAL: negative for flaccid muscle tone or spastic movements. SKIN: gross examination reveals no signs of rashes, or diaphoresis. NEURO: Cranial nerves II-XII grossly intact. No signs of agitated mood.        Assessment/Plan:    cervical pain for C7-T1 KIT

## 2022-09-22 NOTE — DISCHARGE INSTRUCTIONS
[facility] Block/Radiofrequency  Home Going Instructions    1-Go home, rest for the remainder of the day  2-Please do not lift over 20 pounds the day of the injection  3-If you received sedation No: alcohol, driving, operating lawn mowers, plows, tractors or other dangerous equipment until next morning. Do not make important decisions or sign legal documents for 24 hours. You may experience light headedness, dizziness, nausea or sleepiness after sedation. Do not stay alone. A responsible adult must be with you for 24 hours. You could be nauseated from the medications you have received. Your IV site may be sore and bruised. 4-No dietary restrictions     5-Resume all medications the same day, blood thinners to be resumed 24 hours after injection if you were instructed to stop any. 6-Keep the surgical site clean and dry, you may shower the next morning and remove the      dressing. 7- No sitz baths, tub baths or hot tubs/swimming for 24 hours. 8- If you have any pain at the injection site(s), application of an ice pack to the area should be       helpful, 20 minutes on/20 minutes off for next 48 hours. 9- Call [facility] immediately at if you develop.   Fever greater than 100.4 F  Have bleeding or drainage from the puncture site  Have progressive Leg/arm numbness and or weakness  Loss of control of bowel and or bladder (wet/soil yourself)  Severe headache with inability to lift head  10-You may return to work the next day

## 2022-09-27 NOTE — TELEPHONE ENCOUNTER
Patient called in stating that the 4 mg patches work better than the 2 mg, but not as well as she would like. She is wanting to know what she should do now since we do not have samples to give her. Please advise. She is leaving on vacation Saturday.

## 2022-09-28 RX ORDER — ROTIGOTINE 6 MG/24H
1 PATCH, EXTENDED RELEASE TRANSDERMAL DAILY
Qty: 30 PATCH | Refills: 2 | Status: SHIPPED
Start: 2022-09-28 | End: 2022-10-12 | Stop reason: SDUPTHER

## 2022-09-28 NOTE — TELEPHONE ENCOUNTER
Patient called back before I could reach out to her. We do not have any 6 mg patches. Please advise.

## 2022-09-29 RX ORDER — ROTIGOTINE 4 MG/24H
1 PATCH, EXTENDED RELEASE TRANSDERMAL DAILY
Qty: 28 PATCH | Refills: 0 | COMMUNITY
Start: 2022-09-29 | End: 2022-10-17

## 2022-09-29 NOTE — TELEPHONE ENCOUNTER
Spoke with Aimee Cavanaugh and until the Neupro 6mg gets approved, pt is to have 4mg Neupro samples.

## 2022-10-03 ENCOUNTER — TELEPHONE (OUTPATIENT)
Dept: NEUROLOGY | Age: 62
End: 2022-10-03

## 2022-10-03 NOTE — TELEPHONE ENCOUNTER
Faxed auth request to [facility] on 9/29/22. Received fax today that no prior auth is required. Scanned fax to chart.

## 2022-10-12 RX ORDER — ROTIGOTINE 6 MG/24H
1 PATCH, EXTENDED RELEASE TRANSDERMAL DAILY
Qty: 30 PATCH | Refills: 2 | Status: SHIPPED
Start: 2022-10-12 | End: 2022-10-17

## 2022-10-17 ENCOUNTER — TELEPHONE (OUTPATIENT)
Dept: NEUROLOGY | Age: 62
End: 2022-10-17

## 2022-10-17 NOTE — TELEPHONE ENCOUNTER
Patient called in asking about the Neupro patches. She cannot get them at [employer] without a prior authorization. She said she spoke with someone of Friday, but there are no notes in the system. She is wanting to know if Louann Navarro can just put her on something else that is easier to get. She is willing to try the Sinemet.

## 2022-10-18 NOTE — TELEPHONE ENCOUNTER
Answered the phone while leaving Friday. Already logged out. Suggested to patient that I would set up for the request to be sent to [facility] since they approved the request.    Noted that neupro was d/c'd    This is just an FYI at this point.

## 2022-10-25 NOTE — PROGRESS NOTES
[facility]        [address]  [place], [address]  Dept: [phone]        Follow up Note           Date of Visit:  10/26/22     CC:  Patient presents for follow up   Chief Complaint   Patient presents with    Follow-up      Fluoroscopic guided cervical epidural steroid injection, #2 at C7-T1 level. HPI:    Pain is unchanged. Change in quality of symptoms:no. Medication side effects:not applicable . Recent diagnostic testing:none. Recent interventional procedures:C7-T1 KIT with >50% relief. She has not been on anticoagulation medications to include ASA, NSAIDS, Plavix, heparin, LMW heparin and warfarin and has not been on herbal supplements. She is not diabetic. Imagin/2021 EDX -   Diagnostic Interpretation: This study was abnormal.   Electrodiagnosis: NCS/EMG examination performed extensively of the right and left upper extremities including the right low and mid-cervical paraspinal muscle groups show electrodiagnostic evidence for the followin. A chronic right and left median mononeuropathy at the wrist (I.e., carpal tunnel syndrome) with chronic denervation of a moderately severe degree. 2.  A chronic left ulnar neuropathy, nonlocalizable, but probably at or around the level of the elbow with chronic denervation of a mild-moderately severe degree. 3.  A chronic right and left cervical radiculopathy (I.e., primarily C7/C8 myotomal distribution) of moderately severe degree on the right and mild-moderately severe degree on the left. Clinical correlation is recommended. Addendum: Evaluation for cervical spinal stenosis, lower cervical radiculopathy with MR imaging is recommended. Previous Study: None known     Technologist: SC  Physician: Suhas Addison MD     2021 MRI cervical -  FINDINGS:   BONES/ALIGNMENT: There is normal alignment of the spine. The vertebral body   heights are maintained.  The bone marrow signal appears unremarkable. SPINAL CORD: No abnormal cord signal is seen. SOFT TISSUES: No paraspinal mass identified. C2-C3: There is no significant disc protrusion, spinal canal stenosis or   neural foraminal narrowing. C3-C4: There is no significant disc protrusion, spinal canal stenosis or   neural foraminal narrowing. C4-C5: Small disc osteophyte complex indents the ventral thecal sac. C5-C6: Disc osteophyte complex and uncovertebral hypertrophy cause mild   thecal sac and mild right foraminal stenosis. C6-C7: Disc osteophyte complex and uncovertebral hypertrophy cause mild   thecal sac and mild bilateral foraminal stenosis. C7-T1: Facet hypertrophy without stenosis. Impression   Mild spondylosis. Potential Aberrant Drug-Related Behavior:      Urine Drug Screening:    OARRS report:    Past Medical History:   Diagnosis Date    Cervical pain     Osteopenia     Osteoporosis     Tremors of nervous system     Urge incontinence        Past Surgical History:   Procedure Laterality Date    COLONOSCOPY      ENDOMETRIAL ABLATION  11/2007    Isamar Stewart Corporal CYST REMOVAL      PAIN MANAGEMENT PROCEDURE N/A 05/12/2022    C7-T1 EPIDURAL STEROID INJECTION #1 performed by Alex Gutierrez DO at [facility] N/A 9/22/2022    CERVICAL EPIDURAL STEROID INJECTION  C7-T1 performed by Alex Gutierrez DO at [address] Right 07/2014    TUBAL LIGATION  1992       Prior to Admission medications    Medication Sig Start Date End Date Taking?  Authorizing Provider   Probiotic Product (PROBIOTIC ADVANCED PO) Take by mouth   Yes Historical Provider, MD   carbidopa-levodopa (SINEMET)  MG per tablet Take 1 tablet by mouth 2 times daily Take around breakfast and dinner 10/17/22  Yes LIVIA Dasilva - CNS   docusate sodium (COLACE) 100 MG capsule Take 100 mg by mouth 2 times daily   Yes Historical Provider, MD   CALCIUM PO Take 1,200 mg by mouth   Yes Historical Provider, MD   VITAMIN D PO Take 1,600 mg by mouth daily   Yes Historical Provider, MD   vitamin C (ASCORBIC ACID) 500 MG tablet Take 2,000 mg by mouth daily   Yes Historical Provider, MD   alendronate (FOSAMAX) 70 MG tablet Take 70 mg by mouth every 7 days   Yes Historical Provider, MD   hydroCHLOROthiazide (HYDRODIURIL) 25 MG tablet Take 25 mg by mouth as needed 7/21/21  Yes Historical Provider, MD Ortega Batter 500 MG CAPS Take by mouth daily   Yes Historical Provider, MD   Cinnamon 500 MG CAPS Take 1,000 mg by mouth    Yes Historical Provider, MD   tolterodine (DETROL LA) 4 MG extended release capsule Take 4 mg by mouth daily 7/23/19  Yes Historical Provider, MD   azelastine (ASTELIN) 0.1 % nasal spray 1 spray by Nasal route 2 times daily Use in each nostril as directed 8/7/19  Yes Charla Mariee MD   primidone (MYSOLINE) 50 MG tablet 25-50mg daily  Patient not taking: No sig reported 10/28/21   LIVIA Reeder - CNS       Allergies   Allergen Reactions    Penicillins Hives       Social History     Socioeconomic History    Marital status:      Spouse name: Not on file    Number of children: Not on file    Years of education: Not on file    Highest education level: Not on file   Occupational History    Not on file   Tobacco Use    Smoking status: Never    Smokeless tobacco: Never   Vaping Use    Vaping Use: Never used   Substance and Sexual Activity    Alcohol use: Yes     Comment: Rarely    Drug use: Never    Sexual activity: Not on file   Other Topics Concern    Not on file   Social History Narrative    Not on file     Social Determinants of Health     Financial Resource Strain: Not on file   Food Insecurity: Not on file   Transportation Needs: Not on file   Physical Activity: Not on file   Stress: Not on file   Social Connections: Not on file   Intimate Partner Violence: Not on file   Housing Stability: Not on file       Family History   Problem Relation Age of Onset    Alzheimer's Disease Mother     High Blood Pressure Mother     Thyroid Disease Mother     COPD Father     Other Father         AAA    High Cholesterol Brother     Colon Polyps Brother     Other Paternal Aunt         AAA    Other Paternal Uncle         AAA    Cancer Maternal Grandmother     Cancer Maternal Grandfather     Diabetes Maternal Grandfather        REVIEW OF SYSTEMS:     [facility] denies fever/chills, chest pain, shortness of breath, new bowel or bladder complaints. All other review of systems was negative. PHYSICAL EXAMINATION:      /81   Pulse 71   Temp 97.2 °F (36.2 °C) (Infrared)   Resp 16   Ht 5' 8\" (1.727 m)   Wt 175 lb (79.4 kg)   SpO2 96%   BMI 26.61 kg/m²     General:       General appearance:pleasant and well-hydrated, in no distress and A & O x3  Build:Normal Weight  Function:Rises from a seated position with difficulty     HEENT:     Head:normocephalic, atraumatic  Pupils:regular, round, equal  Sclera: icterus absent     Lungs:     Breathing:normal breathing pattern     Abdomen:     Shape:non-distended and normal  Tenderness:none  Guarding:none     Cervical spine:     Inspection:normal  Palpation:tenderness paravertebral muscles, tenderness trapezium, left, right negative  Range of motion:abnormal mildly flexion, extension rotation bilateral and is  painful.      Musculoskeletal:     Trigger points in trapezius:absent bilaterally  Trigger points in rhomboids:absent bilaterally  Trigger points in Paraveteral:absent bilaterally  Trigger points in supraspinatus/infraspinatus:absent  Spurling's:negative right, negative left    Simeon's:negative right, negative left      Extremities:     Tremors:+ RUE  Range of motion:Generally normal shoulders  Intact:Yes  Varicose veins:absent   Pulses:present Lt radial  Cyanosis:none  Edema:none x all 4 extremities     Neurological: Sensory:normal to light touch BUE     Motor:      Right Grip5/5              Left Grip5/5               Right Bicep5/5           Left Bicep5/5              Right Triceps5/5       Left Triceps5/5          Right Deltoid5/5     Left Deltoid5/5                     Reflexes: (not assessed today)      Right Brachioradialis reflex2+  Left Brachioradialis reflex2+  Right Biceps reflex2+  Left Biceps reflex2+  Right Triceps reflex2+  Left Triceps reflex2+     Gait:normal     Dermatology:     Skin:no rashes or lesions noted     Impression:     RUE radicular pain for several years with intermittent N/T and tremor (follows with neurology)  2022 cervical MRI reveals normal alignment, small C5-6 and C6-7 disc herniations  2022 EMG suggests right C7/8 radiculopathies  Works as , 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, a mule, and a driving mini    Last KWABENA was not as helpful as previously but she did get trampled by her horse around the same time  She reports rt carpal tunnel symptoms also controlled with wrist splint     Plan:    Rt wrist cock up splint is helpful  She f/u with Jamin Nolasco for tx of tremors and medication was changed  Urine screen deferred  OARRS report reviewed  Pregabalin titration up to 75 mg TID as tolerated - discussed common SE's  Completed PT  C7-T1 KIT with >50% relief, repeat prn  Encouraged in TENS use  Patient encouraged to stay active  Treatment plan discussed with the patient including medication and procedure side effects     Cc:  Referring physician    LISETTE Jimenez Overall.

## 2022-10-26 ENCOUNTER — OFFICE VISIT (OUTPATIENT)
Dept: PAIN MANAGEMENT | Age: 62
End: 2022-10-26
Payer: COMMERCIAL

## 2022-10-26 VITALS
SYSTOLIC BLOOD PRESSURE: 136 MMHG | BODY MASS INDEX: 26.52 KG/M2 | HEART RATE: 71 BPM | TEMPERATURE: 97.2 F | WEIGHT: 175 LBS | DIASTOLIC BLOOD PRESSURE: 81 MMHG | RESPIRATION RATE: 16 BRPM | HEIGHT: 68 IN | OXYGEN SATURATION: 96 %

## 2022-10-26 DIAGNOSIS — M54.2 CERVICALGIA: ICD-10-CM

## 2022-10-26 DIAGNOSIS — M54.12 CERVICAL RADICULOPATHY: ICD-10-CM

## 2022-10-26 DIAGNOSIS — G56.03 BILATERAL CARPAL TUNNEL SYNDROME: ICD-10-CM

## 2022-10-26 DIAGNOSIS — M79.10 MYALGIA: ICD-10-CM

## 2022-10-26 DIAGNOSIS — M50.90 CERVICAL DISC DISORDER: ICD-10-CM

## 2022-10-26 DIAGNOSIS — G89.4 CHRONIC PAIN SYNDROME: Primary | ICD-10-CM

## 2022-10-26 PROCEDURE — 99213 OFFICE O/P EST LOW 20 MIN: CPT | Performed by: PAIN MEDICINE

## 2022-10-26 PROCEDURE — G8419 CALC BMI OUT NRM PARAM NOF/U: HCPCS | Performed by: PAIN MEDICINE

## 2022-10-26 PROCEDURE — G8484 FLU IMMUNIZE NO ADMIN: HCPCS | Performed by: PAIN MEDICINE

## 2022-10-26 PROCEDURE — G8427 DOCREV CUR MEDS BY ELIG CLIN: HCPCS | Performed by: PAIN MEDICINE

## 2022-10-26 PROCEDURE — 1036F TOBACCO NON-USER: CPT | Performed by: PAIN MEDICINE

## 2022-10-26 PROCEDURE — 3017F COLORECTAL CA SCREEN DOC REV: CPT | Performed by: PAIN MEDICINE

## 2022-10-26 RX ORDER — PREGABALIN 75 MG/1
75 CAPSULE ORAL 3 TIMES DAILY
Qty: 90 CAPSULE | Refills: 1 | Status: SHIPPED | OUTPATIENT
Start: 2022-10-26 | End: 2022-11-25

## 2022-10-26 NOTE — PROGRESS NOTES
Do you currently have any of the following:    Fever: No  Headache:  No  Cough: No  Shortness of breath: No  Exposed to anyone with these symptoms: No         Jam Combs presents to the [facility] on 10/26/2022. [facility] is complaining of pain in her neck. The pain is constant. The pain is described as aching. Pain is rated on her best day at a 0, on her worst day at a 9, and on average at a 2 on the VAS scale. Any procedures since your last visit: Yes, with 0 % relief. Pacemaker or defibrillator: No     She is not on NSAIDS and is not on anticoagulation medications. Medication Contract and Consent for Opioid Use Documents Filed        No documents found                    /81   Pulse 71   Temp 97.2 °F (36.2 °C) (Infrared)   Resp 16   Ht 5' 8\" (1.727 m)   Wt 175 lb (79.4 kg)   SpO2 96%   BMI 26.61 kg/m²      No LMP recorded.  Patient is postmenopausal.

## 2022-11-02 DIAGNOSIS — R25.1 TREMOR: ICD-10-CM

## 2022-11-02 RX ORDER — PRIMIDONE 50 MG/1
TABLET ORAL
Qty: 90 TABLET | Refills: 3 | Status: SHIPPED | OUTPATIENT
Start: 2022-11-02

## 2022-12-08 DIAGNOSIS — M54.2 CERVICALGIA: ICD-10-CM

## 2022-12-08 DIAGNOSIS — G89.4 CHRONIC PAIN SYNDROME: ICD-10-CM

## 2022-12-08 DIAGNOSIS — M50.90 CERVICAL DISC DISORDER: ICD-10-CM

## 2022-12-08 DIAGNOSIS — M54.12 CERVICAL RADICULOPATHY: ICD-10-CM

## 2022-12-08 DIAGNOSIS — M79.10 MYALGIA: ICD-10-CM

## 2022-12-08 DIAGNOSIS — G56.03 BILATERAL CARPAL TUNNEL SYNDROME: ICD-10-CM

## 2022-12-08 NOTE — TELEPHONE ENCOUNTER
Rhenda Gallon called in asking for a 3 month supply of Lyrica as she can get it from [address] [facility] [address] [facility] for $10 versus at the local pharmacy. I advised her I would review with Dr. Erik Jamil.  Her next appointment is 1/4/2023

## 2022-12-09 RX ORDER — PREGABALIN 75 MG/1
75 CAPSULE ORAL 3 TIMES DAILY
Qty: 270 CAPSULE | Refills: 0 | Status: SHIPPED | OUTPATIENT
Start: 2022-12-09 | End: 2023-03-09

## 2022-12-09 NOTE — TELEPHONE ENCOUNTER
I had prescribed a titration so I don't know what her final dosing will be in order to move forward with a 90 day supply. Is her pain sufficiently controlled on this dose and is she without SE's?   If so, I can do the 90 day

## 2023-01-04 ENCOUNTER — OFFICE VISIT (OUTPATIENT)
Dept: PAIN MANAGEMENT | Age: 63
End: 2023-01-04
Payer: COMMERCIAL

## 2023-01-04 VITALS
HEIGHT: 68 IN | RESPIRATION RATE: 18 BRPM | OXYGEN SATURATION: 98 % | WEIGHT: 175 LBS | TEMPERATURE: 97.2 F | HEART RATE: 81 BPM | DIASTOLIC BLOOD PRESSURE: 71 MMHG | SYSTOLIC BLOOD PRESSURE: 129 MMHG | BODY MASS INDEX: 26.52 KG/M2

## 2023-01-04 DIAGNOSIS — M54.2 CERVICALGIA: ICD-10-CM

## 2023-01-04 DIAGNOSIS — M50.90 CERVICAL DISC DISORDER: ICD-10-CM

## 2023-01-04 DIAGNOSIS — G89.29 CHRONIC LEFT-SIDED LOW BACK PAIN WITHOUT SCIATICA: ICD-10-CM

## 2023-01-04 DIAGNOSIS — M79.10 MYALGIA: ICD-10-CM

## 2023-01-04 DIAGNOSIS — M54.50 CHRONIC LEFT-SIDED LOW BACK PAIN WITHOUT SCIATICA: ICD-10-CM

## 2023-01-04 DIAGNOSIS — M54.12 CERVICAL RADICULOPATHY: ICD-10-CM

## 2023-01-04 DIAGNOSIS — G89.4 CHRONIC PAIN SYNDROME: Primary | ICD-10-CM

## 2023-01-04 DIAGNOSIS — G56.03 BILATERAL CARPAL TUNNEL SYNDROME: ICD-10-CM

## 2023-01-04 PROCEDURE — G8419 CALC BMI OUT NRM PARAM NOF/U: HCPCS | Performed by: PAIN MEDICINE

## 2023-01-04 PROCEDURE — G8427 DOCREV CUR MEDS BY ELIG CLIN: HCPCS | Performed by: PAIN MEDICINE

## 2023-01-04 PROCEDURE — 99214 OFFICE O/P EST MOD 30 MIN: CPT

## 2023-01-04 PROCEDURE — G8484 FLU IMMUNIZE NO ADMIN: HCPCS | Performed by: PAIN MEDICINE

## 2023-01-04 PROCEDURE — 99214 OFFICE O/P EST MOD 30 MIN: CPT | Performed by: PAIN MEDICINE

## 2023-01-04 PROCEDURE — 3017F COLORECTAL CA SCREEN DOC REV: CPT | Performed by: PAIN MEDICINE

## 2023-01-04 PROCEDURE — 1036F TOBACCO NON-USER: CPT | Performed by: PAIN MEDICINE

## 2023-01-04 NOTE — PROGRESS NOTES
[facility]        [address]  [place], [address]  Dept: [phone]        Follow up Note      Sarbjit Salinas     Date of Visit:  23     CC:  Patient presents for follow up   Chief Complaint   Patient presents with    Follow-up     Neck pain       HPI:    Pain is better with the pregabalin. Change in quality of symptoms:yes - having left LB symptoms since she had a horse accident before Labor Day. Medication side effects:not applicable . Recent diagnostic testing:none. Recent interventional procedures:C7-T1 KIT with >50% relief. She has not been on anticoagulation medications to include ASA, NSAIDS, Plavix, heparin, LMW heparin and warfarin and has not been on herbal supplements. She is not diabetic. Imagin/2021 EDX -   Diagnostic Interpretation: This study was abnormal.   Electrodiagnosis: NCS/EMG examination performed extensively of the right and left upper extremities including the right low and mid-cervical paraspinal muscle groups show electrodiagnostic evidence for the followin. A chronic right and left median mononeuropathy at the wrist (I.e., carpal tunnel syndrome) with chronic denervation of a moderately severe degree. 2.  A chronic left ulnar neuropathy, nonlocalizable, but probably at or around the level of the elbow with chronic denervation of a mild-moderately severe degree. 3.  A chronic right and left cervical radiculopathy (I.e., primarily C7/C8 myotomal distribution) of moderately severe degree on the right and mild-moderately severe degree on the left. Clinical correlation is recommended. Addendum: Evaluation for cervical spinal stenosis, lower cervical radiculopathy with MR imaging is recommended. Previous Study: None known     Technologist: SC  Physician: Christoph Pineda MD     2021 MRI cervical -  FINDINGS:   BONES/ALIGNMENT: There is normal alignment of the spine.  The vertebral body heights are maintained. The bone marrow signal appears unremarkable. SPINAL CORD: No abnormal cord signal is seen. SOFT TISSUES: No paraspinal mass identified. C2-C3: There is no significant disc protrusion, spinal canal stenosis or   neural foraminal narrowing. C3-C4: There is no significant disc protrusion, spinal canal stenosis or   neural foraminal narrowing. C4-C5: Small disc osteophyte complex indents the ventral thecal sac. C5-C6: Disc osteophyte complex and uncovertebral hypertrophy cause mild   thecal sac and mild right foraminal stenosis. C6-C7: Disc osteophyte complex and uncovertebral hypertrophy cause mild   thecal sac and mild bilateral foraminal stenosis. C7-T1: Facet hypertrophy without stenosis. Impression   Mild spondylosis. Potential Aberrant Drug-Related Behavior:      Urine Drug Screening:    OARRS report:    Past Medical History:   Diagnosis Date    Cervical pain     Osteopenia     Osteoporosis     Tremors of nervous system     Urge incontinence        Past Surgical History:   Procedure Laterality Date    COLONOSCOPY      ENDOMETRIAL ABLATION  11/2007    Isamar Trevizo CYST REMOVAL      PAIN MANAGEMENT PROCEDURE N/A 05/12/2022    C7-T1 EPIDURAL STEROID INJECTION #1 performed by Steffi Mohr DO at [address] N/A 9/22/2022    CERVICAL EPIDURAL STEROID INJECTION  C7-T1 performed by Steffi Mohr DO at [address] [place] [address] Right 07/2014    TUBAL LIGATION  1992       Prior to Admission medications    Medication Sig Start Date End Date Taking? Authorizing Provider   pregabalin (LYRICA) 75 MG capsule Take 1 capsule by mouth in the morning, at noon, and at bedtime for 90 days.  12/9/22 3/9/23 Yes Steffi Mohr DO   primidone (MYSOLINE) 50 MG tablet take 1/2 to 1 tablet by mouth once daily 11/2/22   Marya Poet LIVIA Easton   Probiotic Product (PROBIOTIC ADVANCED PO) Take by mouth   Yes Historical Provider, MD   carbidopa-levodopa (SINEMET)  MG per tablet Take 1 tablet by mouth 2 times daily Take around breakfast and dinner 10/17/22  Yes LIVIA Ramos   docusate sodium (COLACE) 100 MG capsule Take 100 mg by mouth 2 times daily   Yes Historical Provider, MD   CALCIUM PO Take 1,200 mg by mouth   Yes Historical Provider, MD   VITAMIN D PO Take 1,600 mg by mouth daily   Yes Historical Provider, MD   vitamin C (ASCORBIC ACID) 500 MG tablet Take 2,000 mg by mouth daily   Yes Historical Provider, MD   alendronate (FOSAMAX) 70 MG tablet Take 70 mg by mouth every 7 days   Yes Historical Provider, MD   hydroCHLOROthiazide (HYDRODIURIL) 25 MG tablet Take 25 mg by mouth as needed 7/21/21  Yes Historical Provider, MD Lemos Leventhal 500 MG CAPS Take by mouth daily   Yes Historical Provider, MD   Cinnamon 500 MG CAPS Take 1,000 mg by mouth    Yes Historical Provider, MD   azelastine (ASTELIN) 0.1 % nasal spray 1 spray by Nasal route 2 times daily Use in each nostril as directed 8/7/19  Yes Linda Scott MD   tolterodine (DETROL LA) 4 MG extended release capsule Take 4 mg by mouth daily  Patient not taking: Reported on 1/4/2023 7/23/19   Historical Provider, MD       Allergies   Allergen Reactions    Penicillins Hives       Social History     Socioeconomic History    Marital status:      Spouse name: Not on file    Number of children: Not on file    Years of education: Not on file    Highest education level: Not on file   Occupational History    Not on file   Tobacco Use    Smoking status: Never    Smokeless tobacco: Never   Vaping Use    Vaping Use: Never used   Substance and Sexual Activity    Alcohol use: Yes     Comment: Rarely    Drug use: Never    Sexual activity: Not on file   Other Topics Concern    Not on file   Social History Narrative    Not on file     Social Determinants of Health Financial Resource Strain: Not on file   Food Insecurity: Not on file   Transportation Needs: Not on file   Physical Activity: Not on file   Stress: Not on file   Social Connections: Not on file   Intimate Partner Violence: Not on file   Housing Stability: Not on file       Family History   Problem Relation Age of Onset    Alzheimer's Disease Mother     High Blood Pressure Mother     Thyroid Disease Mother     COPD Father     Other Father         AAA    High Cholesterol Brother     Colon Polyps Brother     Other Paternal Aunt         AAA    Other Paternal Uncle         AAA    Cancer Maternal Grandmother     Cancer Maternal Grandfather     Diabetes Maternal Grandfather        REVIEW OF SYSTEMS:     Cliffton Riding denies fever/chills, chest pain, shortness of breath, new bowel or bladder complaints. All other review of systems was negative. PHYSICAL EXAMINATION:      /71   Pulse 81   Temp 97.2 °F (36.2 °C) (Temporal)   Resp 18   Ht 5' 8\" (1.727 m)   Wt 175 lb (79.4 kg)   SpO2 98%   BMI 26.61 kg/m²     General:       General appearance:pleasant and well-hydrated, in no distress and A & O x3  Build:Normal Weight  Function:Rises from a seated position with difficulty     HEENT:     Head:normocephalic, atraumatic  Pupils:regular, round, equal  Sclera: icterus absent     Lungs:     Breathing:normal breathing pattern     Abdomen:     Shape:non-distended and normal  Tenderness:none  Guarding:none     Cervical spine:     Inspection:normal  Palpation:tenderness paravertebral muscles, tenderness trapezium, left, right negative  Range of motion:abnormal mildly flexion, extension rotation bilateral and is  painful.     Lumbar spine:  Full ROM, no tenderness to palpation     Musculoskeletal:     Trigger points in trapezius:absent bilaterally  Trigger points in rhomboids:absent bilaterally  Trigger points in Paraveteral:absent bilaterally  Trigger points in supraspinatus/infraspinatus:absent  Spurling's:negative right, negative left    Simeon's:negative right, negative left      Extremities:     Tremors:+ RUE  Range of motion:Generally normal shoulders  Intact:Yes  Varicose veins:absent   Pulses:present Lt radial  Cyanosis:none  Edema:none x all 4 extremities     Neurological:     Sensory:normal to light touch BUE     Motor:      BLE diffusely5/5  Right Grip5/5              Left Grip5/5               Right Bicep5/5           Left Bicep5/5              Right Triceps5/5       Left Triceps5/5          Right Deltoid5/5     Left Deltoid5/5                     Reflexes: (not assessed today)      BLE diffusely 2+  Right Brachioradialis reflex2+  Left Brachioradialis reflex2+  Right Biceps reflex2+  Left Biceps reflex2+  Right Triceps reflex2+  Left Triceps reflex2+     Gait:normal     Dermatology:     Skin:no rashes or lesions noted     Impression:     RUE radicular pain for several years with intermittent N/T and tremor (follows with neurology)  2022 cervical MRI reveals normal alignment, small C5-6 and C6-7 disc herniations  2022 EMG suggests right C7/8 radiculopathies  Works as , 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, a mule, and a driving mini    She feels the pregabalin has been somewhat helpful  She has some left L-S junction pain that she's had since her horse trampled her - is improving with time but slow to do so  Offered lumbar imaging, she prefers to hold off at this time  No neuro deficits     Plan:    Rt wrist cock up splint is helpful  She f/u with Joseph Will for tx of tremors and medication was changed  Urine screen deferred  OARRS report reviewed  continue pregabalin 75 mg TID (can take 75/150) - discussed common SE's - no RF needed  Completed PT  C7-T1 KIT with >50% relief, repeat prn  Encouraged in TENS use  Patient encouraged to stay active  Treatment plan discussed with the patient including medication and procedure side effects     Cc:  Referring physician    LISETTE Lipscomb.

## 2023-01-04 NOTE — PROGRESS NOTES
Do you currently have any of the following:    Fever: No  Headache:  No  Cough: No  Shortness of breath: No  Exposed to anyone with these symptoms: No         Rayne Jacobsen presents to the [facility] on 1/4/2023. Manolo Garcia is complaining of pain neck pain. The pain is intermittent. The pain is described as aching and burning. Pain is rated on her best day at a 2, on her worst day at a 9, and on average at a 5 on the VAS scale. She took her last dose of Lyrica today   Any procedures since your last visit: No    Pacemaker or defibrillator: No    She is not on NSAIDS and is not on anticoagulation medications to   Medication Contract and Consent for Opioid Use Documents Filed        No documents found                    /71   Pulse 81   Temp 97.2 °F (36.2 °C) (Temporal)   Resp 18   Ht 5' 8\" (1.727 m)   Wt 175 lb (79.4 kg)   SpO2 98%   BMI 26.61 kg/m²      No LMP recorded.  Patient is postmenopausal.

## 2023-03-07 ENCOUNTER — OFFICE VISIT (OUTPATIENT)
Dept: PAIN MANAGEMENT | Age: 63
End: 2023-03-07
Payer: COMMERCIAL

## 2023-03-07 VITALS
OXYGEN SATURATION: 97 % | TEMPERATURE: 97.3 F | DIASTOLIC BLOOD PRESSURE: 83 MMHG | HEART RATE: 73 BPM | BODY MASS INDEX: 26.52 KG/M2 | WEIGHT: 175 LBS | HEIGHT: 68 IN | RESPIRATION RATE: 16 BRPM | SYSTOLIC BLOOD PRESSURE: 134 MMHG

## 2023-03-07 DIAGNOSIS — M50.90 CERVICAL DISC DISORDER: ICD-10-CM

## 2023-03-07 DIAGNOSIS — M54.50 CHRONIC LEFT-SIDED LOW BACK PAIN WITHOUT SCIATICA: ICD-10-CM

## 2023-03-07 DIAGNOSIS — M54.2 CERVICALGIA: ICD-10-CM

## 2023-03-07 DIAGNOSIS — G89.29 CHRONIC LEFT-SIDED LOW BACK PAIN WITHOUT SCIATICA: ICD-10-CM

## 2023-03-07 DIAGNOSIS — G56.03 BILATERAL CARPAL TUNNEL SYNDROME: ICD-10-CM

## 2023-03-07 DIAGNOSIS — M79.10 MYALGIA: ICD-10-CM

## 2023-03-07 DIAGNOSIS — G89.4 CHRONIC PAIN SYNDROME: Primary | ICD-10-CM

## 2023-03-07 DIAGNOSIS — M54.12 CERVICAL RADICULOPATHY: ICD-10-CM

## 2023-03-07 PROCEDURE — G8427 DOCREV CUR MEDS BY ELIG CLIN: HCPCS | Performed by: PAIN MEDICINE

## 2023-03-07 PROCEDURE — 3017F COLORECTAL CA SCREEN DOC REV: CPT | Performed by: PAIN MEDICINE

## 2023-03-07 PROCEDURE — G8484 FLU IMMUNIZE NO ADMIN: HCPCS | Performed by: PAIN MEDICINE

## 2023-03-07 PROCEDURE — 99213 OFFICE O/P EST LOW 20 MIN: CPT | Performed by: PAIN MEDICINE

## 2023-03-07 PROCEDURE — G8419 CALC BMI OUT NRM PARAM NOF/U: HCPCS | Performed by: PAIN MEDICINE

## 2023-03-07 PROCEDURE — 1036F TOBACCO NON-USER: CPT | Performed by: PAIN MEDICINE

## 2023-03-07 RX ORDER — PREGABALIN 75 MG/1
75 CAPSULE ORAL 3 TIMES DAILY
Qty: 270 CAPSULE | Refills: 0 | Status: SHIPPED | OUTPATIENT
Start: 2023-03-07 | End: 2023-06-05

## 2023-03-07 NOTE — PROGRESS NOTES
[facility]        [address]  [facility], [address] [place] [address]  Dept: [phone]        Follow up Note      Shepard Severance     Date of Visit:  23     CC:  Patient presents for follow up   Chief Complaint   Patient presents with    Follow-up     Right shoulder     HPI:    Pain is better with the pregabalin. Change in quality of symptoms:no. Medication side effects:none. Recent diagnostic testing:none. Recent interventional procedures:none. She has not been on anticoagulation medications to include ASA, NSAIDS, Plavix, heparin, LMW heparin and warfarin and has not been on herbal supplements. She is not diabetic. Imagin/2021 EDX -   Diagnostic Interpretation: This study was abnormal.   Electrodiagnosis: NCS/EMG examination performed extensively of the right and left upper extremities including the right low and mid-cervical paraspinal muscle groups show electrodiagnostic evidence for the followin. A chronic right and left median mononeuropathy at the wrist (I.e., carpal tunnel syndrome) with chronic denervation of a moderately severe degree. 2.  A chronic left ulnar neuropathy, nonlocalizable, but probably at or around the level of the elbow with chronic denervation of a mild-moderately severe degree. 3.  A chronic right and left cervical radiculopathy (I.e., primarily C7/C8 myotomal distribution) of moderately severe degree on the right and mild-moderately severe degree on the left. Clinical correlation is recommended. Addendum: Evaluation for cervical spinal stenosis, lower cervical radiculopathy with MR imaging is recommended. Previous Study: None known     Technologist: SC  Physician: Johnny Major MD     2021 MRI cervical -  FINDINGS:   BONES/ALIGNMENT: There is normal alignment of the spine. The vertebral body   heights are maintained. The bone marrow signal appears unremarkable.        SPINAL CORD: No abnormal cord signal is seen. SOFT TISSUES: No paraspinal mass identified. C2-C3: There is no significant disc protrusion, spinal canal stenosis or   neural foraminal narrowing. C3-C4: There is no significant disc protrusion, spinal canal stenosis or   neural foraminal narrowing. C4-C5: Small disc osteophyte complex indents the ventral thecal sac. C5-C6: Disc osteophyte complex and uncovertebral hypertrophy cause mild   thecal sac and mild right foraminal stenosis. C6-C7: Disc osteophyte complex and uncovertebral hypertrophy cause mild   thecal sac and mild bilateral foraminal stenosis. C7-T1: Facet hypertrophy without stenosis. Impression   Mild spondylosis. Potential Aberrant Drug-Related Behavior:      Urine Drug Screening:    OARRS report:  3/2023 consistent    Past Medical History:   Diagnosis Date    Cervical pain     Osteopenia     Osteoporosis     Tremors of nervous system     Urge incontinence        Past Surgical History:   Procedure Laterality Date    COLONOSCOPY      ENDOMETRIAL ABLATION  11/2007    Isamar Haider CYST REMOVAL      PAIN MANAGEMENT PROCEDURE N/A 05/12/2022    C7-T1 EPIDURAL STEROID INJECTION #1 performed by Cassandra Alcazar DO at [address] N/A 9/22/2022    CERVICAL EPIDURAL STEROID INJECTION  C7-T1 performed by Cassandra Alcazar DO at [address] Right 07/2014    TUBAL LIGATION  1992       Prior to Admission medications    Medication Sig Start Date End Date Taking? Authorizing Provider   carbidopa-levodopa (SINEMET)  MG per tablet take 1 tablet by mouth twice a day TAKE AROUND BREAKFAST AND DINNER 1/12/23  Yes LIVIA Vazquez - CNS   pregabalin (LYRICA) 75 MG capsule Take 1 capsule by mouth in the morning, at noon, and at bedtime for 90 days.  12/9/22 3/9/23 Yes Cassandra Alcazar DO   primidone (MYSOLINE) 50 MG tablet take 1/2 to 1 tablet by mouth once daily 11/2/22   Steffi Garcia APRN - CNS   Probiotic Product (PROBIOTIC ADVANCED PO) Take by mouth   Yes Historical Provider, MD   docusate sodium (COLACE) 100 MG capsule Take 100 mg by mouth 2 times daily   Yes Historical Provider, MD   CALCIUM PO Take 1,200 mg by mouth   Yes Historical Provider, MD   VITAMIN D PO Take 1,600 mg by mouth daily   Yes Historical Provider, MD   vitamin C (ASCORBIC ACID) 500 MG tablet Take 2,000 mg by mouth daily   Yes Historical Provider, MD   alendronate (FOSAMAX) 70 MG tablet Take 70 mg by mouth every 7 days   Yes Historical Provider, MD   hydroCHLOROthiazide (HYDRODIURIL) 25 MG tablet Take 25 mg by mouth as needed 7/21/21  Yes Historical Provider, MD   Kip Crooked 500 MG CAPS Take by mouth daily   Yes Historical Provider, MD   Cinnamon 500 MG CAPS Take 1,000 mg by mouth    Yes Historical Provider, MD   tolterodine (DETROL LA) 4 MG extended release capsule Take 4 mg by mouth daily 7/23/19  Yes Historical Provider, MD   azelastine (ASTELIN) 0.1 % nasal spray 1 spray by Nasal route 2 times daily Use in each nostril as directed 8/7/19  Yes Nancy Hobson MD       Allergies   Allergen Reactions    Penicillins Hives       Social History     Socioeconomic History    Marital status:      Spouse name: Not on file    Number of children: Not on file    Years of education: Not on file    Highest education level: Not on file   Occupational History    Not on file   Tobacco Use    Smoking status: Never    Smokeless tobacco: Never   Vaping Use    Vaping Use: Never used   Substance and Sexual Activity    Alcohol use: Yes     Comment: Rarely    Drug use: Never    Sexual activity: Not on file   Other Topics Concern    Not on file   Social History Narrative    Not on file     Social Determinants of Health     Financial Resource Strain: Not on file   Food Insecurity: Not on file   Transportation Needs: Not on file   Physical Activity: Not on file Stress: Not on file   Social Connections: Not on file   Intimate Partner Violence: Not on file   Housing Stability: Not on file       Family History   Problem Relation Age of Onset    Alzheimer's Disease Mother     High Blood Pressure Mother     Thyroid Disease Mother     COPD Father     Other Father         AAA    High Cholesterol Brother     Colon Polyps Brother     Other Paternal Aunt         AAA    Other Paternal Uncle         AAA    Cancer Maternal Grandmother     Cancer Maternal Grandfather     Diabetes Maternal Grandfather        REVIEW OF SYSTEMS:     Vane Dennis denies fever/chills, chest pain, shortness of breath, new bowel or bladder complaints. All other review of systems was negative. PHYSICAL EXAMINATION:      /83   Pulse 73   Temp 97.3 °F (36.3 °C) (Temporal)   Resp 16   Ht 5' 8\" (1.727 m)   Wt 175 lb (79.4 kg)   SpO2 97%   BMI 26.61 kg/m²     General:       General appearance:pleasant and well-hydrated, in no distress and A & O x3  Build:Normal Weight  Function:Rises from a seated position with difficulty     HEENT:     Head:normocephalic, atraumatic  Pupils:regular, round, equal  Sclera: icterus absent     Lungs:     Breathing:normal breathing pattern     Abdomen:     Shape:non-distended and normal  Tenderness:none  Guarding:none     Cervical spine:     Inspection:normal  Palpation:tenderness paravertebral muscles, tenderness trapezium, left, right negative  Range of motion:abnormal mildly flexion, extension rotation bilateral and is  painful.     Lumbar spine:  Full ROM, + tenderness to palpation     Musculoskeletal:     Trigger points in trapezius:absent bilaterally  Trigger points in rhomboids:absent bilaterally  Trigger points in Paraveteral:absent bilaterally  Trigger points in supraspinatus/infraspinatus:absent  Spurling's:negative right, negative left    Simeon's:negative right, negative left      Extremities:     Tremors:+ RUE  Range of motion:Generally normal shoulders  Intact:Yes  Varicose veins:absent   Pulses:present Lt radial  Cyanosis:none  Edema:none x all 4 extremities     Neurological:     Sensory:normal to light touch BUE     Motor:      BLE diffusely5/5  Right Grip5/5              Left Grip5/5               Right Bicep5/5           Left Bicep5/5              Right Triceps5/5       Left Triceps5/5          Right Deltoid5/5     Left Deltoid5/5                     Reflexes: (not assessed today)      BLE diffusely 2+  Right Brachioradialis reflex2+  Left Brachioradialis reflex2+  Right Biceps reflex2+  Left Biceps reflex2+  Right Triceps reflex2+  Left Triceps reflex2+     Gait:normal     Dermatology:     Skin:no rashes or lesions noted     Impression:     RUE radicular pain for several years with intermittent N/T and tremor (follows with neurology)  2022 cervical MRI reveals normal alignment, small C5-6 and C6-7 disc herniations  2022 EMG suggests right C7/8 radiculopathies  Works as , owns [employer] walker horses, a mule, and a driving mini    She feels the pregabalin has been helpful  She has some left L-S junction pain that she's had since her horse trampled her - is improving with time but slow to do so  Offered lumbar imaging, she prefers to hold off at this time  No neuro deficits  She was getting some improvement w/ a chiro but now needs to change chiros as her previous is not currently doing manipulation     Plan:    Rt wrist cock up splint is helpful  She f/u with Aimee Cavanaugh for tx of tremors and medication was changed  Urine screen deferred  OARRS report reviewed  RF pregabalin 75 mg TID (can take 75/150) - no SE's  Completed PT  C7-T1 KIT with >50% relief, repeat prn  Encouraged in TENS use  Patient encouraged to stay active  Treatment plan discussed with the patient including medication and procedure side effects     Cc:  Referring physician    LISETTE Glez

## 2023-03-07 NOTE — PROGRESS NOTES
Do you currently have any of the following:    Fever: No  Headache:  No  Cough: No  Shortness of breath: No  Exposed to anyone with these symptoms: No         Keith Brian presents to the [facility] on 3[facility]7/2023. [facility] is complaining of pain right shoulder. The pain is not constant The pain is described as aching, throbbing, shooting, and burning. Pain is rated on her best day at a 0, on her worst day at a 9, and on average at a 5 on the VAS scale. She took her last dose of Lyrica this morning. Any procedures since your last visit: No,  Pacemaker or defibrillator: No   She is not on NSAIDS and is not on anticoagulation medications   Medication Contract and Consent for Opioid Use Documents Filed        No documents found                    Temp 97.3 °F (36.3 °C) (Temporal)   Resp 16   Ht 5' 8\" (1.727 m)   Wt 175 lb (79.4 kg)   BMI 26.61 kg/m²      No LMP recorded.  Patient is postmenopausal.

## 2023-06-06 ENCOUNTER — OFFICE VISIT (OUTPATIENT)
Dept: PAIN MANAGEMENT | Age: 63
End: 2023-06-06
Payer: COMMERCIAL

## 2023-06-06 ENCOUNTER — HOSPITAL ENCOUNTER (OUTPATIENT)
Age: 63
Discharge: HOME | End: 2023-06-08
Payer: COMMERCIAL

## 2023-06-06 ENCOUNTER — HOSPITAL ENCOUNTER (OUTPATIENT)
Dept: GENERAL RADIOLOGY | Age: 63
Discharge: HOME | End: 2023-06-08
Payer: COMMERCIAL

## 2023-06-06 VITALS
HEART RATE: 69 BPM | RESPIRATION RATE: 16 BRPM | HEIGHT: 68 IN | TEMPERATURE: 97.2 F | BODY MASS INDEX: 27.28 KG/M2 | SYSTOLIC BLOOD PRESSURE: 135 MMHG | DIASTOLIC BLOOD PRESSURE: 79 MMHG | OXYGEN SATURATION: 99 % | WEIGHT: 180 LBS

## 2023-06-06 DIAGNOSIS — M47.816 LUMBAR SPONDYLOSIS: ICD-10-CM

## 2023-06-06 DIAGNOSIS — M79.10 MYALGIA: ICD-10-CM

## 2023-06-06 DIAGNOSIS — G89.29 CHRONIC BILATERAL LOW BACK PAIN WITHOUT SCIATICA: ICD-10-CM

## 2023-06-06 DIAGNOSIS — M54.50 CHRONIC BILATERAL LOW BACK PAIN WITHOUT SCIATICA: ICD-10-CM

## 2023-06-06 DIAGNOSIS — G89.4 CHRONIC PAIN SYNDROME: ICD-10-CM

## 2023-06-06 DIAGNOSIS — G89.4 CHRONIC PAIN SYNDROME: Primary | ICD-10-CM

## 2023-06-06 PROCEDURE — 1036F TOBACCO NON-USER: CPT | Performed by: PAIN MEDICINE

## 2023-06-06 PROCEDURE — 72114 X-RAY EXAM L-S SPINE BENDING: CPT

## 2023-06-06 PROCEDURE — 99214 OFFICE O/P EST MOD 30 MIN: CPT | Performed by: PAIN MEDICINE

## 2023-06-06 PROCEDURE — G8419 CALC BMI OUT NRM PARAM NOF/U: HCPCS | Performed by: PAIN MEDICINE

## 2023-06-06 PROCEDURE — 99213 OFFICE O/P EST LOW 20 MIN: CPT | Performed by: PAIN MEDICINE

## 2023-06-06 PROCEDURE — G8427 DOCREV CUR MEDS BY ELIG CLIN: HCPCS | Performed by: PAIN MEDICINE

## 2023-06-06 PROCEDURE — 3017F COLORECTAL CA SCREEN DOC REV: CPT | Performed by: PAIN MEDICINE

## 2023-06-06 NOTE — PROGRESS NOTES
Alberto Guerin presents to the [facility] on 6/6/2023. Solomon Medina is complaining of pain low back . The pain is intermittent. The pain is described as aching and throbbing. Pain is rated on her best day at a 1, on her worst day at a 9, and on average at a 5 on the VAS scale. She took her last dose of Lyrica this moring. Any procedures since your last visit: No    Pacemaker or defibrillator: No    She is not on NSAIDS and is not on anticoagulation medications   Medication Contract and Consent for Opioid Use Documents Filed        No documents found                    /79   Pulse 69   Temp 97.2 °F (36.2 °C) (Temporal)   Resp 16   Ht 5' 8\" (1.727 m)   Wt 180 lb (81.6 kg)   SpO2 99%   BMI 27.37 kg/m²      No LMP recorded.  Patient is postmenopausal.
side effects     Cc:  Referring physician    LISETTE Carreon.

## 2023-06-21 ENCOUNTER — HOSPITAL ENCOUNTER (OUTPATIENT)
Dept: MRI IMAGING | Age: 63
Discharge: HOME | End: 2023-06-23
Attending: PAIN MEDICINE
Payer: COMMERCIAL

## 2023-06-21 DIAGNOSIS — G89.4 CHRONIC PAIN SYNDROME: ICD-10-CM

## 2023-06-21 DIAGNOSIS — M54.50 CHRONIC BILATERAL LOW BACK PAIN WITHOUT SCIATICA: ICD-10-CM

## 2023-06-21 DIAGNOSIS — G89.29 CHRONIC BILATERAL LOW BACK PAIN WITHOUT SCIATICA: ICD-10-CM

## 2023-06-21 DIAGNOSIS — M47.816 LUMBAR SPONDYLOSIS: ICD-10-CM

## 2023-06-21 DIAGNOSIS — M79.10 MYALGIA: ICD-10-CM

## 2023-06-21 PROCEDURE — 72148 MRI LUMBAR SPINE W/O DYE: CPT

## 2023-06-29 ENCOUNTER — OFFICE VISIT (OUTPATIENT)
Dept: PAIN MANAGEMENT | Age: 63
End: 2023-06-29
Payer: COMMERCIAL

## 2023-06-29 ENCOUNTER — PREP FOR PROCEDURE (OUTPATIENT)
Dept: PAIN MANAGEMENT | Age: 63
End: 2023-06-29

## 2023-06-29 VITALS
HEART RATE: 62 BPM | WEIGHT: 180 LBS | HEIGHT: 68 IN | DIASTOLIC BLOOD PRESSURE: 82 MMHG | OXYGEN SATURATION: 97 % | BODY MASS INDEX: 27.28 KG/M2 | SYSTOLIC BLOOD PRESSURE: 146 MMHG

## 2023-06-29 DIAGNOSIS — M50.90 CERVICAL DISC DISORDER: ICD-10-CM

## 2023-06-29 DIAGNOSIS — M54.12 CERVICAL RADICULOPATHY: ICD-10-CM

## 2023-06-29 DIAGNOSIS — M48.062 SPINAL STENOSIS OF LUMBAR REGION WITH NEUROGENIC CLAUDICATION: Primary | ICD-10-CM

## 2023-06-29 DIAGNOSIS — M48.062 SPINAL STENOSIS OF LUMBAR REGION WITH NEUROGENIC CLAUDICATION: ICD-10-CM

## 2023-06-29 DIAGNOSIS — G89.29 CHRONIC BILATERAL LOW BACK PAIN WITHOUT SCIATICA: ICD-10-CM

## 2023-06-29 DIAGNOSIS — M54.2 CERVICALGIA: ICD-10-CM

## 2023-06-29 DIAGNOSIS — G89.4 CHRONIC PAIN SYNDROME: Primary | ICD-10-CM

## 2023-06-29 DIAGNOSIS — M47.816 LUMBAR SPONDYLOSIS: ICD-10-CM

## 2023-06-29 DIAGNOSIS — M54.50 CHRONIC BILATERAL LOW BACK PAIN WITHOUT SCIATICA: ICD-10-CM

## 2023-06-29 PROCEDURE — G8427 DOCREV CUR MEDS BY ELIG CLIN: HCPCS | Performed by: PAIN MEDICINE

## 2023-06-29 PROCEDURE — 99214 OFFICE O/P EST MOD 30 MIN: CPT | Performed by: PAIN MEDICINE

## 2023-06-29 PROCEDURE — 3017F COLORECTAL CA SCREEN DOC REV: CPT | Performed by: PAIN MEDICINE

## 2023-06-29 PROCEDURE — 99213 OFFICE O/P EST LOW 20 MIN: CPT | Performed by: PAIN MEDICINE

## 2023-06-29 PROCEDURE — 1036F TOBACCO NON-USER: CPT | Performed by: PAIN MEDICINE

## 2023-06-29 PROCEDURE — G8419 CALC BMI OUT NRM PARAM NOF/U: HCPCS | Performed by: PAIN MEDICINE

## 2023-07-17 ENCOUNTER — TELEPHONE (OUTPATIENT)
Dept: PAIN MANAGEMENT | Age: 63
End: 2023-07-17

## 2023-07-17 PROBLEM — M54.16 LUMBAR RADICULOPATHY: Status: ACTIVE | Noted: 2023-07-17

## 2023-07-17 NOTE — TELEPHONE ENCOUNTER
Call to Philipp Quinonez that procedure was approved for 7/25/2023 and that Georgie Mullen should call her a few days before for the pre op call and between 2:00 PM and 4:00 PM  the business day before with the arrival time. Instructed Nancy to hold ibuprofen for 24 hours, naprosyn for 4 days and any aspirin containing products or fish oil for 7 days. Instructed to call office back if any questions. Nancy verbalized understanding.     Electronically signed by Rosina Bradford RN on 7/17/2023 at 2:16 PM

## 2023-07-20 NOTE — PROGRESS NOTES
[facility] PAIN MANAGEMENT  INSTRUCTIONS  . .......................................................................................................................................... [x] Parking the day of Surgery is located in the [facility].   Upon entering the door, make immediate right into the surgery reception room    [x]  Bring photo ID and insurance card     [x] You may have a light breakfast day of procedure    [x]  Wear loose comfortable clothing    [x]  Please follow instructions for medications as given per Dr's office    [x] You can expect a call the business day prior to procedure to notify you of your arrival time     [x] Please arrange for     []  Other instructions

## 2023-07-25 ENCOUNTER — APPOINTMENT (OUTPATIENT)
Dept: CT IMAGING | Age: 63
End: 2023-07-25
Payer: COMMERCIAL

## 2023-07-25 ENCOUNTER — TELEPHONE (OUTPATIENT)
Dept: PAIN MANAGEMENT | Age: 63
End: 2023-07-25

## 2023-07-25 ENCOUNTER — HOSPITAL ENCOUNTER (OUTPATIENT)
Age: 63
Setting detail: OUTPATIENT SURGERY
Discharge: HOME | End: 2023-07-25
Attending: PAIN MEDICINE | Admitting: PAIN MEDICINE
Payer: COMMERCIAL

## 2023-07-25 ENCOUNTER — HOSPITAL ENCOUNTER (OUTPATIENT)
Dept: GENERAL RADIOLOGY | Age: 63
Setting detail: OUTPATIENT SURGERY
Discharge: HOME | End: 2023-07-27
Attending: PAIN MEDICINE
Payer: COMMERCIAL

## 2023-07-25 ENCOUNTER — HOSPITAL ENCOUNTER (EMERGENCY)
Age: 63
Discharge: HOME | End: 2023-07-25
Attending: EMERGENCY MEDICINE
Payer: COMMERCIAL

## 2023-07-25 VITALS
WEIGHT: 170 LBS | DIASTOLIC BLOOD PRESSURE: 65 MMHG | OXYGEN SATURATION: 99 % | SYSTOLIC BLOOD PRESSURE: 139 MMHG | TEMPERATURE: 97.2 F | HEIGHT: 68 IN | HEART RATE: 84 BPM | BODY MASS INDEX: 25.76 KG/M2 | RESPIRATION RATE: 16 BRPM

## 2023-07-25 VITALS
TEMPERATURE: 97.6 F | BODY MASS INDEX: 25.76 KG/M2 | RESPIRATION RATE: 17 BRPM | HEART RATE: 59 BPM | DIASTOLIC BLOOD PRESSURE: 75 MMHG | OXYGEN SATURATION: 99 % | HEIGHT: 68 IN | WEIGHT: 170 LBS | SYSTOLIC BLOOD PRESSURE: 122 MMHG

## 2023-07-25 DIAGNOSIS — K59.00 CONSTIPATION, UNSPECIFIED CONSTIPATION TYPE: Primary | ICD-10-CM

## 2023-07-25 DIAGNOSIS — R52 PAIN MANAGEMENT: ICD-10-CM

## 2023-07-25 DIAGNOSIS — M54.16 LUMBAR RADICULOPATHY: ICD-10-CM

## 2023-07-25 LAB
ALBUMIN SERPL-MCNC: 4.5 G/DL (ref 3.5–5.2)
ALP SERPL-CCNC: 56 U/L (ref 35–104)
ALT SERPL-CCNC: 32 U/L (ref 0–32)
ANION GAP SERPL CALCULATED.3IONS-SCNC: 11 MMOL/L (ref 7–16)
AST SERPL-CCNC: 30 U/L (ref 0–31)
BILIRUB SERPL-MCNC: 0.3 MG/DL (ref 0–1.2)
BUN SERPL-MCNC: 15 MG/DL (ref 6–23)
CALCIUM SERPL-MCNC: 10.3 MG/DL (ref 8.6–10.2)
CHLORIDE SERPL-SCNC: 104 MMOL/L (ref 98–107)
CO2 SERPL-SCNC: 24 MMOL/L (ref 22–29)
CREAT SERPL-MCNC: 0.7 MG/DL (ref 0.5–1)
ERYTHROCYTE [DISTWIDTH] IN BLOOD BY AUTOMATED COUNT: 14.9 % (ref 11.5–15)
GFR SERPL CREATININE-BSD FRML MDRD: >60 ML/MIN/1.73M2
GLUCOSE SERPL-MCNC: 118 MG/DL (ref 74–99)
HCT VFR BLD AUTO: 41.3 % (ref 34–48)
HGB BLD-MCNC: 13.7 G/DL (ref 11.5–15.5)
LIPASE SERPL-CCNC: 17 U/L (ref 13–60)
MCH RBC QN AUTO: 30.2 PG (ref 26–35)
MCHC RBC AUTO-ENTMCNC: 33.2 G/DL (ref 32–34.5)
MCV RBC AUTO: 91.2 FL (ref 80–99.9)
PLATELET # BLD AUTO: 309 K/UL (ref 130–450)
PMV BLD AUTO: 9.7 FL (ref 7–12)
POTASSIUM SERPL-SCNC: 4.4 MMOL/L (ref 3.5–5)
PROT SERPL-MCNC: 6.9 G/DL (ref 6.4–8.3)
RBC # BLD AUTO: 4.53 M/UL (ref 3.5–5.5)
SODIUM SERPL-SCNC: 139 MMOL/L (ref 132–146)
WBC OTHER # BLD: 9.1 K/UL (ref 4.5–11.5)

## 2023-07-25 PROCEDURE — 96372 THER/PROPH/DIAG INJ SC/IM: CPT

## 2023-07-25 PROCEDURE — 80053 COMPREHEN METABOLIC PANEL: CPT

## 2023-07-25 PROCEDURE — 74176 CT ABD & PELVIS W/O CONTRAST: CPT

## 2023-07-25 PROCEDURE — 7100000010 HC PHASE II RECOVERY - FIRST 15 MIN: Performed by: PAIN MEDICINE

## 2023-07-25 PROCEDURE — 6360000002 HC RX W HCPCS: Performed by: EMERGENCY MEDICINE

## 2023-07-25 PROCEDURE — 6360000004 HC RX CONTRAST MEDICATION: Performed by: PAIN MEDICINE

## 2023-07-25 PROCEDURE — 83690 ASSAY OF LIPASE: CPT

## 2023-07-25 PROCEDURE — 6360000002 HC RX W HCPCS: Performed by: PAIN MEDICINE

## 2023-07-25 PROCEDURE — 62323 NJX INTERLAMINAR LMBR/SAC: CPT | Performed by: PAIN MEDICINE

## 2023-07-25 PROCEDURE — 7100000011 HC PHASE II RECOVERY - ADDTL 15 MIN: Performed by: PAIN MEDICINE

## 2023-07-25 PROCEDURE — 3600000002 HC SURGERY LEVEL 2 BASE: Performed by: PAIN MEDICINE

## 2023-07-25 PROCEDURE — 2709999900 HC NON-CHARGEABLE SUPPLY: Performed by: PAIN MEDICINE

## 2023-07-25 PROCEDURE — 85027 COMPLETE CBC AUTOMATED: CPT

## 2023-07-25 PROCEDURE — 99284 EMERGENCY DEPT VISIT MOD MDM: CPT

## 2023-07-25 PROCEDURE — 2500000003 HC RX 250 WO HCPCS: Performed by: PAIN MEDICINE

## 2023-07-25 RX ORDER — LIDOCAINE HYDROCHLORIDE 5 MG/ML
INJECTION, SOLUTION INFILTRATION; INTRAVENOUS PRN
Status: DISCONTINUED | OUTPATIENT
Start: 2023-07-25 | End: 2023-07-25 | Stop reason: ALTCHOICE

## 2023-07-25 RX ORDER — METHYLPREDNISOLONE ACETATE 40 MG/ML
INJECTION, SUSPENSION INTRA-ARTICULAR; INTRALESIONAL; INTRAMUSCULAR; SOFT TISSUE PRN
Status: DISCONTINUED | OUTPATIENT
Start: 2023-07-25 | End: 2023-07-25 | Stop reason: ALTCHOICE

## 2023-07-25 RX ORDER — DICYCLOMINE HYDROCHLORIDE 10 MG/ML
20 INJECTION INTRAMUSCULAR ONCE
Status: COMPLETED | OUTPATIENT
Start: 2023-07-25 | End: 2023-07-25

## 2023-07-25 RX ADMIN — DICYCLOMINE HYDROCHLORIDE 20 MG: 20 INJECTION, SOLUTION INTRAMUSCULAR at 17:06

## 2023-07-25 ASSESSMENT — PAIN DESCRIPTION - DESCRIPTORS
DESCRIPTORS: SHARP
DESCRIPTORS: STABBING

## 2023-07-25 ASSESSMENT — PAIN DESCRIPTION - LOCATION
LOCATION: ABDOMEN
LOCATION: ABDOMEN

## 2023-07-25 ASSESSMENT — PAIN DESCRIPTION - ORIENTATION
ORIENTATION: RIGHT;LOWER
ORIENTATION: RIGHT

## 2023-07-25 ASSESSMENT — PAIN SCALES - GENERAL
PAINLEVEL_OUTOF10: 0
PAINLEVEL_OUTOF10: 9
PAINLEVEL_OUTOF10: 0
PAINLEVEL_OUTOF10: 0

## 2023-07-25 ASSESSMENT — PAIN DESCRIPTION - FREQUENCY: FREQUENCY: INTERMITTENT

## 2023-07-25 ASSESSMENT — LIFESTYLE VARIABLES
HOW MANY STANDARD DRINKS CONTAINING ALCOHOL DO YOU HAVE ON A TYPICAL DAY: PATIENT DOES NOT DRINK
HOW OFTEN DO YOU HAVE A DRINK CONTAINING ALCOHOL: NEVER

## 2023-07-25 ASSESSMENT — PAIN DESCRIPTION - PAIN TYPE: TYPE: ACUTE PAIN

## 2023-07-25 ASSESSMENT — PAIN DESCRIPTION - ONSET: ONSET: SUDDEN

## 2023-07-25 ASSESSMENT — PAIN - FUNCTIONAL ASSESSMENT
PAIN_FUNCTIONAL_ASSESSMENT: 0-10
PAIN_FUNCTIONAL_ASSESSMENT: 0-10
PAIN_FUNCTIONAL_ASSESSMENT: PREVENTS OR INTERFERES SOME ACTIVE ACTIVITIES AND ADLS

## 2023-07-25 NOTE — ED NOTES
Department of Emergency Medicine  FIRST PROVIDER TRIAGE NOTE             Independent MLP           7/25/23  3:32 PM EDT    Date of Encounter: 7/25/23   MRN: [id number]      HPI: Linden Royal is a 61 y.o. female who presents to the ED for Abdominal Pain (Intermittent RLQ abd pain x2 hrs, states had epidural this am)     Patient is having 15 out of 10 pain with right lower quadrant. Patient epidural this AM.  Patient called her pain management doctor and that she told her it was not related therefore to come in for further evaluation    ROS: Negative for cp, sob, vomiting, diarrhea, or urinary complaints. PE: Gen Appearance/Constitutional: alert  HEENT: NC/NT. PERRLA,  Airway patent. Neck: supple     Initial Plan of Care: All treatment areas with department are currently occupied. Plan to order/Initiate the following while awaiting opening in ED: labs and imaging studies.   Initiate Treatment-Testing, Proceed toTreatment Area When Bed Available for ED Attending/MLP to Continue Care    Electronically signed by Rajani Brenner PA-C   DD: 7/25/23       Rajani Brenner PA-C  07/25/23 0851

## 2023-07-25 NOTE — ED PROVIDER NOTES
HPI:  7/25/23,   Time: 4:47 PM EDT         Patricia Jaramillo is a 61 y.o. female presenting to the ED for evaluation of right-sided abdominal pain. Patient states she had an epidural performed earlier today. After epidural she went to work. She states shortly after lunch she developed an intense cramping pain in the right side of her abdomen. She contacted her her physician did the epidural did not feel it be likely due to that. She states it feels like labor pains. She has had no vomiting or diarrhea. She had mild nausea when the pain was intense but then it resolved. The intensity waxes and wanes but the pain is constant. She has had no history of bowel disorders or bowel surgery. ROS:   Pertinent positives and negatives are stated within HPI, all other systems reviewed and are negative.  --------------------------------------------- PAST HISTORY ---------------------------------------------  Past Medical History:  has a past medical history of Cervical pain, Osteopenia, Osteoporosis, Tremors of nervous system, and Urge incontinence. Past Surgical History:  has a past surgical history that includes Endometrial ablation (11/2007); Tubal ligation (1992); Mandible surgery (1981); ovarian cyst removal; ovarian cyst drainage; repair retinal tear/hole (Right, 07/2014); eye surgery; Pain management procedure (N/A, 05/12/2022); Colonoscopy; Pain management procedure (N/A, 9/22/2022); and Pain management procedure (N/A, 7/25/2023). Social History:  reports that she has never smoked. She has never used smokeless tobacco. She reports current alcohol use. She reports that she does not use drugs. Family History: family history includes Alzheimer's Disease in her mother; COPD in her father; Cancer in her maternal grandfather and maternal grandmother; Colon Polyps in her brother; Diabetes in her maternal grandfather; High Blood Pressure in her mother; High Cholesterol in her brother;  Other in her father,

## 2023-07-25 NOTE — OP NOTE
2023    Patient: Noé Flores  :  1960  Age:  61 y.o. Sex:  female     PRE-OPERATIVE DIAGNOSIS: Lumbar disc displacement, lumbar radiculopathy. POST-OPERATIVE DIAGNOSIS: Same. PROCEDURE: Fluoroscopic guided therapeutic lumbar epidural steroid injection at the L3-4 level. SURGEON: LISETTE Aponte. ANESTHESIA: local    ESTIMATED BLOOD LOSS: None.  __________________________________________________    BRIEF HISTORY:  Noé Flores comes in today for the lumbar epidural injection at L3-4 level. The potential complications of this procedure were discussed with her again today. She has elected to undergo the aforementioned procedure. Ana complete History & Physical examination were reviewed in depth, a copy of which is in the chart. DESCRIPTION OF PROCEDURE:    After confirming written and informed consent, a time-out was performed and Ana name and date of birth, the procedure to be performed as well as the plan for the location of the needle insertion were confirmed. The patient was brought into the procedure room and placed in the prone position on the fluoroscopy table. A pillow was placed under the patient's lower abdomen/upper pelvis to increase lumbar interlaminar space. Standard monitors were placed, and vital signs were observed throughout the procedure. The area of the lumbar spine was prepped with chloraprep and draped in a sterile manner. The L3-4 interspace was identified and marked under AP fluoroscopy. The skin and subcutaneous tissues at the above level were anesthestized with 0.5% lidocaine. With intermittent fluoroscopy, an # 18 gauge 3 1/2 tuohy epidural needle was inserted and directed toward the interlaminar space. The needle was slowly advanced using loss of resistance technique and 5 cc glass syringe until the tip of the epidural needle has passed through the ligamentum flavum and entered the epidural space.  AP and lateral fluoroscopic imaging was

## 2023-07-25 NOTE — DISCHARGE INSTRUCTIONS
Rhonda [facility]  Home Going Instructions    1-Go home, rest for the remainder of the day  2-Please do not lift over 20 pounds the day of the injection  3-If you received sedation No: alcohol, driving, operating lawn mowers, plows, tractors or other dangerous equipment until next morning. Do not make important decisions or sign legal documents for 24 hours. You may experience light headedness, dizziness, nausea or sleepiness after sedation. Do not stay alone. A responsible adult must be with you for 24 hours. You could be nauseated from the medications you have received. Your IV site may be sore and bruised. 4-No dietary restrictions     5-Resume all medications the same day, blood thinners to be resumed 24 hours after injection if you were instructed to stop any. 6-Keep the surgical site clean and dry, you may shower the next morning and remove the      dressing. 7- No sitz baths, tub baths or hot tubs/swimming for 24 hours. 8- If you have any pain at the injection site(s), application of an ice pack to the area should be       helpful, 20 minutes on/20 minutes off for next 48 hours. 9- Call [facility] immediately at if you develop.   Fever greater than 100.4 F  Have bleeding or drainage from the puncture site  Have progressive Leg/arm numbness and or weakness  Loss of control of bowel and or bladder (wet/soil yourself)  Severe headache with inability to lift head  10-You may return to work the next day

## 2023-07-25 NOTE — TELEPHONE ENCOUNTER
Inna Pravin called in and stated that she was having extreme lower right quadrant pain and was positive for rebound tenderness. She was asking if it was anything from her injection this morning or if she was experiencing appendicitis. I explained that is not something that could be determined over the phone and that if the pain was that extreme she should be examined. She stated understanding and said she was going to go get checked.

## 2023-07-25 NOTE — H&P
[facility]        [address] [facility], [address] [place] [address]  Dept: [phone]    Procedure History & Physical      Luis Miguel Brett     HPI:    Patient  is here for LBP RLE pain for L3-4 KIT  Labs/imaging studies reviewed   All question and concerns addressed including R/B/A associated with the procedure    Past Medical History:   Diagnosis Date    Cervical pain     Osteopenia     Osteoporosis     Tremors of nervous system     Urge incontinence        Past Surgical History:   Procedure Laterality Date    COLONOSCOPY      ENDOMETRIAL ABLATION  11/2007    Isamar Schroeder N/A 05/12/2022    C7-T1 EPIDURAL STEROID INJECTION #1 performed by Hattie King DO at [address] [facility] N/A 9/22/2022    CERVICAL EPIDURAL STEROID INJECTION  C7-T1 performed by Hattie King DO at [address] [facility] Right 07/2014    [facility]       Prior to Admission medications    Medication Sig Start Date End Date Taking?  Authorizing Provider   mirabegron (MYRBETRIQ) 25 MG TB24 Take 1 tablet by mouth daily    Historical Provider, MD   carbidopa-levodopa (SINEMET)  MG per tablet take 1 tablet by mouth twice a day [address]  Patient not taking: Reported on 6/29/2023 1/12/23   LIVIA Taylor - CNS   primidone (MYSOLINE) 50 MG tablet take 1/2 to 1 tablet by mouth once daily  Patient not taking: Reported on 6/29/2023 11/2/22   LIVIA Taylor - CNS   Probiotic Product (PROBIOTIC ADVANCED PO) Take by mouth    Historical Provider, MD   docusate sodium (COLACE) 100 MG capsule Take 1 capsule by mouth 2 times daily    Historical Provider, MD   CALCIUM PO Take 1,200 mg by mouth    Historical Provider, MD   VITAMIN D PO Take 1,600 mg by mouth daily    Historical Provider, MD   vitamin C (ASCORBIC ACID) 500 MG tablet

## 2023-08-29 ENCOUNTER — OFFICE VISIT (OUTPATIENT)
Dept: PAIN MANAGEMENT | Age: 63
End: 2023-08-29
Payer: COMMERCIAL

## 2023-08-29 ENCOUNTER — PREP FOR PROCEDURE (OUTPATIENT)
Dept: PAIN MANAGEMENT | Age: 63
End: 2023-08-29

## 2023-08-29 VITALS
TEMPERATURE: 97 F | BODY MASS INDEX: 25.76 KG/M2 | WEIGHT: 170 LBS | DIASTOLIC BLOOD PRESSURE: 74 MMHG | SYSTOLIC BLOOD PRESSURE: 123 MMHG | HEART RATE: 67 BPM | OXYGEN SATURATION: 98 % | RESPIRATION RATE: 16 BRPM | HEIGHT: 68 IN

## 2023-08-29 DIAGNOSIS — M48.062 SPINAL STENOSIS OF LUMBAR REGION WITH NEUROGENIC CLAUDICATION: ICD-10-CM

## 2023-08-29 DIAGNOSIS — M54.16 LUMBAR RADICULOPATHY: Primary | ICD-10-CM

## 2023-08-29 DIAGNOSIS — M47.816 LUMBAR SPONDYLOSIS: ICD-10-CM

## 2023-08-29 DIAGNOSIS — M54.50 CHRONIC BILATERAL LOW BACK PAIN WITHOUT SCIATICA: ICD-10-CM

## 2023-08-29 DIAGNOSIS — G89.29 CHRONIC BILATERAL LOW BACK PAIN WITHOUT SCIATICA: ICD-10-CM

## 2023-08-29 DIAGNOSIS — G89.4 CHRONIC PAIN SYNDROME: Primary | ICD-10-CM

## 2023-08-29 PROCEDURE — G8419 CALC BMI OUT NRM PARAM NOF/U: HCPCS | Performed by: PAIN MEDICINE

## 2023-08-29 PROCEDURE — 3017F COLORECTAL CA SCREEN DOC REV: CPT | Performed by: PAIN MEDICINE

## 2023-08-29 PROCEDURE — 99213 OFFICE O/P EST LOW 20 MIN: CPT | Performed by: PAIN MEDICINE

## 2023-08-29 PROCEDURE — 1036F TOBACCO NON-USER: CPT | Performed by: PAIN MEDICINE

## 2023-08-29 PROCEDURE — G8427 DOCREV CUR MEDS BY ELIG CLIN: HCPCS | Performed by: PAIN MEDICINE

## 2023-09-22 ENCOUNTER — TELEPHONE (OUTPATIENT)
Dept: PAIN MANAGEMENT | Age: 63
End: 2023-09-22

## 2023-09-22 DIAGNOSIS — M54.16 LUMBAR RADICULOPATHY: Primary | ICD-10-CM

## 2023-09-22 NOTE — TELEPHONE ENCOUNTER
Call to Michelle Blanton that procedure was approved for 9/28/2023 and that Georgie Mullen should call her a few days before for the pre op call and between 2:00 PM and 4:00 PM  the business day before with the arrival time. Instructed Nancy to hold ibuprofen for 24 hours, naprosyn for 4 days and any aspirin containing products or fish oil for 7 days. Instructed to call office back if any questions. Nancy verbalized understanding.     Electronically signed by Chava Floyd RN on 9/22/2023 at 10:46 AM

## 2023-09-26 NOTE — PROGRESS NOTES
[address] [facility] PAIN MANAGEMENT  INSTRUCTIONS  . .......................................................................................................................................... [x] Parking the day of Surgery is located in the [facility].   Upon entering the door, make immediate right into the surgery reception room    [x]  Bring photo ID and insurance card     [x] You may have a light breakfast day of procedure    [x]  Wear loose comfortable clothing    [x]  Please follow instructions for medications as given per Dr's office    [x] You can expect a call the business day prior to procedure to notify you of your arrival time     [x] Please arrange for     []  Other instructions

## 2023-09-28 ENCOUNTER — HOSPITAL ENCOUNTER (OUTPATIENT)
Dept: GENERAL RADIOLOGY | Age: 63
Discharge: HOME | End: 2023-09-30
Attending: PAIN MEDICINE
Payer: COMMERCIAL

## 2023-09-28 ENCOUNTER — HOSPITAL ENCOUNTER (OUTPATIENT)
Age: 63
Setting detail: OUTPATIENT SURGERY
Discharge: HOME | End: 2023-09-28
Attending: PAIN MEDICINE | Admitting: PAIN MEDICINE
Payer: COMMERCIAL

## 2023-09-28 VITALS
RESPIRATION RATE: 16 BRPM | WEIGHT: 161 LBS | HEART RATE: 52 BPM | HEIGHT: 69 IN | OXYGEN SATURATION: 98 % | DIASTOLIC BLOOD PRESSURE: 72 MMHG | BODY MASS INDEX: 23.85 KG/M2 | SYSTOLIC BLOOD PRESSURE: 130 MMHG | TEMPERATURE: 97.5 F

## 2023-09-28 DIAGNOSIS — R52 PAIN MANAGEMENT: ICD-10-CM

## 2023-09-28 PROCEDURE — 2709999900 HC NON-CHARGEABLE SUPPLY: Performed by: PAIN MEDICINE

## 2023-09-28 PROCEDURE — 7100000011 HC PHASE II RECOVERY - ADDTL 15 MIN: Performed by: PAIN MEDICINE

## 2023-09-28 PROCEDURE — 3600000002 HC SURGERY LEVEL 2 BASE: Performed by: PAIN MEDICINE

## 2023-09-28 PROCEDURE — 62323 NJX INTERLAMINAR LMBR/SAC: CPT | Performed by: PAIN MEDICINE

## 2023-09-28 PROCEDURE — 7100000010 HC PHASE II RECOVERY - FIRST 15 MIN: Performed by: PAIN MEDICINE

## 2023-09-28 PROCEDURE — 6360000002 HC RX W HCPCS: Performed by: PAIN MEDICINE

## 2023-09-28 PROCEDURE — 6360000004 HC RX CONTRAST MEDICATION: Performed by: PAIN MEDICINE

## 2023-09-28 PROCEDURE — 2500000003 HC RX 250 WO HCPCS: Performed by: PAIN MEDICINE

## 2023-09-28 RX ORDER — LIDOCAINE HYDROCHLORIDE 5 MG/ML
INJECTION, SOLUTION INFILTRATION; INTRAVENOUS PRN
Status: DISCONTINUED | OUTPATIENT
Start: 2023-09-28 | End: 2023-09-28 | Stop reason: ALTCHOICE

## 2023-09-28 RX ORDER — METHYLPREDNISOLONE ACETATE 40 MG/ML
INJECTION, SUSPENSION INTRA-ARTICULAR; INTRALESIONAL; INTRAMUSCULAR; SOFT TISSUE PRN
Status: DISCONTINUED | OUTPATIENT
Start: 2023-09-28 | End: 2023-09-28 | Stop reason: ALTCHOICE

## 2023-09-28 RX ORDER — IOPAMIDOL 612 MG/ML
INJECTION, SOLUTION INTRATHECAL PRN
Status: DISCONTINUED | OUTPATIENT
Start: 2023-09-28 | End: 2023-09-28 | Stop reason: ALTCHOICE

## 2023-09-28 ASSESSMENT — PAIN DESCRIPTION - DESCRIPTORS: DESCRIPTORS: ACHING

## 2023-09-28 ASSESSMENT — PAIN - FUNCTIONAL ASSESSMENT: PAIN_FUNCTIONAL_ASSESSMENT: 0-10

## 2023-09-28 NOTE — H&P
[facility]        [address] [facility], [address] [facility]  Dept: [phone]    Procedure History & Physical      Giuliana Krishsally     HPI:    Patient  is here for LBP BLE pain for L3-4 KIT  Labs/imaging studies reviewed   All question and concerns addressed including R/B/A associated with the procedure    Past Medical History:   Diagnosis Date    Cervical pain     Osteopenia     Tremors of nervous system     Urge incontinence        Past Surgical History:   Procedure Laterality Date    COLONOSCOPY      ENDOMETRIAL ABLATION  11/2007    Isamar Lamar N/A 05/12/2022    C7-T1 EPIDURAL STEROID INJECTION #1 performed by Jorge Vela DO at [address] [place] [address] N/A 9/22/2022    CERVICAL EPIDURAL STEROID INJECTION  C7-T1 performed by Jorge Vela DO at [address] [place] [address] N/A 7/25/2023    L3-4 EPIDURAL STEROID INJECTION performed by Jorge Vela DO at [address] [place] [address] Right 07/2014    [address]       Prior to Admission medications    Medication Sig Start Date End Date Taking?  Authorizing Provider   mirabegron (MYRBETRIQ) 25 MG TB24 Take 1 tablet by mouth daily    Historical Provider, MD   Probiotic Product (PROBIOTIC ADVANCED PO) Take by mouth    Historical Provider, MD   docusate sodium (COLACE) 100 MG capsule Take 1 capsule by mouth 2 times daily    Historical Provider, MD   CALCIUM PO Take 1,200 mg by mouth    Historical Provider, MD   VITAMIN D PO Take 1,600 mg by mouth daily    Historical Provider, MD   vitamin C (ASCORBIC ACID) 500 MG tablet Take 4 tablets by mouth daily    Historical Provider, MD   alendronate (FOSAMAX) 70 MG tablet Take 1 tablet by mouth every 7 days    Historical Provider, MD   hydroCHLOROthiazide (HYDRODIURIL) 25 MG tablet Take 1 tablet by mouth as needed 7/21/21   Historical

## 2023-09-28 NOTE — OP NOTE
2023    Patient: Noble Joseph  :  1960  Age:  61 y.o. Sex:  female     PRE-OPERATIVE DIAGNOSIS: Lumbar disc displacement, lumbar radiculopathy. POST-OPERATIVE DIAGNOSIS: Same. PROCEDURE: Fluoroscopic guided therapeutic lumbar epidural steroid injection at the L3-4 level. SURGEON: LISETTE Delong. ANESTHESIA: local    ESTIMATED BLOOD LOSS: None.  __________________________________________________    BRIEF HISTORY:  Noble Joseph comes in today for the lumbar epidural injection at L3-4 level. The potential complications of this procedure were discussed with her again today. She has elected to undergo the aforementioned procedure. Ana complete History & Physical examination were reviewed in depth, a copy of which is in the chart. DESCRIPTION OF PROCEDURE:    After confirming written and informed consent, a time-out was performed and Ana name and date of birth, the procedure to be performed as well as the plan for the location of the needle insertion were confirmed. The patient was brought into the procedure room and placed in the prone position on the fluoroscopy table. A pillow was placed under the patient's lower abdomen/upper pelvis to increase lumbar interlaminar space. Standard monitors were placed, and vital signs were observed throughout the procedure. The area of the lumbar spine was prepped with chloraprep and draped in a sterile manner. The L3-4 interspace was identified and marked under AP fluoroscopy. The skin and subcutaneous tissues at the above level were anesthestized with 0.5% lidocaine. With intermittent fluoroscopy, an # 18 gauge 3 1/2 tuohy epidural needle was inserted and directed toward the interlaminar space. The needle was slowly advanced using loss of resistance technique and 5 cc glass syringe until the tip of the epidural needle has passed through the ligamentum flavum and entered the epidural space.  AP and lateral fluoroscopic imaging was

## 2023-09-28 NOTE — DISCHARGE INSTRUCTIONS
Ned Hatchet Dr. Lyndel Bilis Dr. Lulla Mulberry Block/[facility]  Home Going Instructions    1-Go home, rest for the remainder of the day  2-Please do not lift over 20 pounds the day of the injection  3-If you received sedation No: alcohol, driving, operating lawn mowers, plows, tractors or other dangerous equipment until next morning. Do not make important decisions or sign legal documents for 24 hours. You may experience light headedness, dizziness, nausea or sleepiness after sedation. Do not stay alone. A responsible adult must be with you for 24 hours. You could be nauseated from the medications you have received. Your IV site may be sore and bruised. 4-No dietary restrictions     5-Resume all medications the same day, blood thinners to be resumed 24 hours after injection if you were instructed to stop any. 6-Keep the surgical site clean and dry, you may shower the next morning and remove the      dressing. 7- No sitz baths, tub baths or hot tubs/swimming for 24 hours. 8- If you have any pain at the injection site(s), application of an ice pack to the area should be       helpful, 20 minutes on/20 minutes off for next 48 hours. 9- Call [facility] immediately at if you develop.   Fever greater than 100.4 F  Have bleeding or drainage from the puncture site  Have progressive Leg/arm numbness and or weakness  Loss of control of bowel and or bladder (wet/soil yourself)  Severe headache with inability to lift head  10-You may return to work the next day

## 2023-12-06 NOTE — PROGRESS NOTES
[facility]        [address] [facility]  Dept: [phone]        Follow up Note      Remy Peralta     Date of Visit:  23     CC:  Patient presents for follow up   Chief Complaint   Patient presents with    Back Pain    Shoulder Pain     Back pain is good today. 23 DOS #2.  RT Side #3 Shoulder possibly from pinched nerve. LT Side #3 No trauma or fall new pain. HPI:    Pain is better. Change in quality of symptoms:no. Medication side effects:none. Recent diagnostic testing:none. Recent interventional procedures:L3-4 KIT with >50% relief. She has not been on anticoagulation medications to include ASA, NSAIDS, Plavix, heparin, LMW heparin and warfarin and has not been on herbal supplements. She is not diabetic. Imagin/2023 lumbar MRI w/o -  FINDINGS:  BONES/ALIGNMENT: There is normal alignment of the spine. The vertebral body  heights are maintained. The bone marrow signal appears unremarkable. SPINAL CORD: The conus terminates normally. SOFT TISSUES: No paraspinal mass identified. L1-L2: Mild disc desiccation without herniation. No significant central  canal, lateral recess or neural foraminal stenoses. L2-L3: Disc desiccation with a central disc protrusion. Moderate central  canal stenosis. No significant lateral recess or neural foraminal stenoses. L3-L4: Disc desiccation with a small disc bulge. Mild facet and ligamentous  hypertrophy. Mild central canal stenosis. No significant lateral recess or  neural foraminal stenoses. L4-L5: Disc desiccation with a small disc bulge. Small annular tear in the  posterior midline disc. Mild facet hypertrophy. No significant central  canal, lateral recess or neural foraminal stenoses. L5-S1: Disc desiccation with a small disc bulge. Small annular tear in the  posterior midline disc. Mild facet hypertrophy.   No significant central  canal,

## 2023-12-07 ENCOUNTER — OFFICE VISIT (OUTPATIENT)
Dept: PAIN MANAGEMENT | Age: 63
End: 2023-12-07
Payer: COMMERCIAL

## 2023-12-07 VITALS
DIASTOLIC BLOOD PRESSURE: 84 MMHG | OXYGEN SATURATION: 98 % | TEMPERATURE: 97 F | HEART RATE: 67 BPM | SYSTOLIC BLOOD PRESSURE: 129 MMHG

## 2023-12-07 DIAGNOSIS — G89.4 CHRONIC PAIN SYNDROME: Primary | ICD-10-CM

## 2023-12-07 DIAGNOSIS — M54.16 LUMBAR RADICULOPATHY: ICD-10-CM

## 2023-12-07 DIAGNOSIS — M50.90 CERVICAL DISC DISORDER: ICD-10-CM

## 2023-12-07 DIAGNOSIS — M54.12 CERVICAL RADICULOPATHY: ICD-10-CM

## 2023-12-07 DIAGNOSIS — M47.816 LUMBAR SPONDYLOSIS: ICD-10-CM

## 2023-12-07 DIAGNOSIS — M48.062 SPINAL STENOSIS OF LUMBAR REGION WITH NEUROGENIC CLAUDICATION: ICD-10-CM

## 2023-12-07 DIAGNOSIS — G89.29 CHRONIC BILATERAL LOW BACK PAIN WITHOUT SCIATICA: ICD-10-CM

## 2023-12-07 DIAGNOSIS — M54.50 CHRONIC BILATERAL LOW BACK PAIN WITHOUT SCIATICA: ICD-10-CM

## 2023-12-07 DIAGNOSIS — M79.10 MYALGIA: ICD-10-CM

## 2023-12-07 DIAGNOSIS — M54.2 CERVICALGIA: ICD-10-CM

## 2023-12-07 PROCEDURE — 99214 OFFICE O/P EST MOD 30 MIN: CPT | Performed by: PAIN MEDICINE

## 2023-12-07 PROCEDURE — 99213 OFFICE O/P EST LOW 20 MIN: CPT

## 2023-12-07 PROCEDURE — G8484 FLU IMMUNIZE NO ADMIN: HCPCS | Performed by: PAIN MEDICINE

## 2023-12-07 PROCEDURE — G8420 CALC BMI NORM PARAMETERS: HCPCS | Performed by: PAIN MEDICINE

## 2023-12-07 PROCEDURE — 1036F TOBACCO NON-USER: CPT | Performed by: PAIN MEDICINE

## 2023-12-07 PROCEDURE — G8427 DOCREV CUR MEDS BY ELIG CLIN: HCPCS | Performed by: PAIN MEDICINE

## 2023-12-07 PROCEDURE — 3017F COLORECTAL CA SCREEN DOC REV: CPT | Performed by: PAIN MEDICINE

## 2024-04-03 ENCOUNTER — OFFICE VISIT (OUTPATIENT)
Dept: PAIN MANAGEMENT | Age: 64
End: 2024-04-03
Payer: COMMERCIAL

## 2024-04-03 VITALS
SYSTOLIC BLOOD PRESSURE: 117 MMHG | OXYGEN SATURATION: 94 % | DIASTOLIC BLOOD PRESSURE: 75 MMHG | HEART RATE: 68 BPM | WEIGHT: 150 LBS | HEIGHT: 68 IN | BODY MASS INDEX: 22.73 KG/M2 | TEMPERATURE: 98.2 F | RESPIRATION RATE: 16 BRPM

## 2024-04-03 DIAGNOSIS — G89.4 CHRONIC PAIN SYNDROME: Primary | ICD-10-CM

## 2024-04-03 DIAGNOSIS — M50.90 CERVICAL DISC DISORDER: ICD-10-CM

## 2024-04-03 DIAGNOSIS — M54.16 LUMBAR RADICULOPATHY: ICD-10-CM

## 2024-04-03 DIAGNOSIS — M54.12 CERVICAL RADICULOPATHY: ICD-10-CM

## 2024-04-03 DIAGNOSIS — M54.2 CERVICALGIA: ICD-10-CM

## 2024-04-03 DIAGNOSIS — M48.062 SPINAL STENOSIS OF LUMBAR REGION WITH NEUROGENIC CLAUDICATION: ICD-10-CM

## 2024-04-03 PROCEDURE — 3017F COLORECTAL CA SCREEN DOC REV: CPT | Performed by: PAIN MEDICINE

## 2024-04-03 PROCEDURE — G8420 CALC BMI NORM PARAMETERS: HCPCS | Performed by: PAIN MEDICINE

## 2024-04-03 PROCEDURE — 99213 OFFICE O/P EST LOW 20 MIN: CPT

## 2024-04-03 PROCEDURE — G8427 DOCREV CUR MEDS BY ELIG CLIN: HCPCS | Performed by: PAIN MEDICINE

## 2024-04-03 PROCEDURE — 99213 OFFICE O/P EST LOW 20 MIN: CPT | Performed by: PAIN MEDICINE

## 2024-04-03 PROCEDURE — 1036F TOBACCO NON-USER: CPT | Performed by: PAIN MEDICINE

## 2024-04-03 NOTE — PROGRESS NOTES
[facility]        [address] [address]  Dept: [phone]        Follow up Note      Nancy Desir     Date of Visit:  24     CC:  Patient presents for follow up   Chief Complaint   Patient presents with    Follow-up     Lower back      HPI:    Pain is unchanged.  Change in quality of symptoms:yes - having RUE tingling.   Medication side effects:none.   Recent diagnostic testing:none.   Recent interventional procedures:none.    She has not been on anticoagulation medications to include ASA, NSAIDS, Plavix, heparin, LMW heparin and warfarin and has not been on herbal supplements.  She is not diabetic.     Imagin/2023 lumbar MRI w/o -  FINDINGS:  BONES/ALIGNMENT: There is normal alignment of the spine. The vertebral body  heights are maintained. The bone marrow signal appears unremarkable.     SPINAL CORD: The conus terminates normally.     SOFT TISSUES: No paraspinal mass identified.     L1-L2: Mild disc desiccation without herniation.  No significant central  canal, lateral recess or neural foraminal stenoses.     L2-L3: Disc desiccation with a central disc protrusion.  Moderate central  canal stenosis.  No significant lateral recess or neural foraminal stenoses.     L3-L4: Disc desiccation with a small disc bulge.  Mild facet and ligamentous  hypertrophy.  Mild central canal stenosis.  No significant lateral recess or  neural foraminal stenoses.     L4-L5: Disc desiccation with a small disc bulge.  Small annular tear in the  posterior midline disc.  Mild facet hypertrophy.  No significant central  canal, lateral recess or neural foraminal stenoses.     L5-S1: Disc desiccation with a small disc bulge.  Small annular tear in the  posterior midline disc.  Mild facet hypertrophy.  No significant central  canal, lateral recess or neural foraminal stenoses.     IMPRESSION:  1.  No fracture or bony destructive lesion.  2. Moderate central canal

## 2024-04-03 NOTE — PROGRESS NOTES
Nancy Desir presents to the [facility] on 4/3/2024. Nancy is complaining of pain low back . The pain is constant. The pain is described as aching. Pain is rated on her best day at a 1, on her worst day at a 8, and on average at a 6 on the VAS scale. She took her last dose of Motrin .    Any procedures since your last visit: No    She is not on NSAIDS and  is not on anticoagulation medications   Pacemaker or defibrillator: No         Medication Contract and Consent for Opioid Use Documents Filed        No documents found                       Temp 98.2 °F (36.8 °C) (Temporal)   Resp 16   Ht 1.727 m (5' 8\")   Wt 68 kg (150 lb)   SpO2 97%   BMI 22.81 kg/m²      No LMP recorded. Patient is postmenopausal.

## (undated) DEVICE — BANDAGE ADH W1XL3IN NAT FAB WVN FLX DURABLE N ADH PD SEAL

## (undated) DEVICE — SYRINGE, LUER LOCK, 5ML: Brand: MEDLINE

## (undated) DEVICE — GAUZE,SPONGE,4"X4",8PLY,STRL,LF,10/TRAY: Brand: MEDLINE

## (undated) DEVICE — Z DISCONTINUED APPLICATOR SURG PREP 0.35OZ 2% CHG 70% ISO ALC W/ HI LT

## (undated) DEVICE — Device: Brand: PORTEX

## (undated) DEVICE — NON-DEHP CATHETER EXTENSION SET, MALE LUER LOCK ADAPTER

## (undated) DEVICE — GLOVE ORANGE PI 7 1/2   MSG9075

## (undated) DEVICE — 12 ML SYRINGE,LUER-LOCK TIP: Brand: MONOJECT

## (undated) DEVICE — GAUZE,SPONGE,4"X4",12PLY,STERILE,LF,2'S: Brand: MEDLINE

## (undated) DEVICE — 3M™ RED DOT™ MONITORING ELECTRODE WITH FOAM TAPE AND STICKY GEL 2560, 50/BAG, 20/CASE, 72/PLT: Brand: RED DOT™

## (undated) DEVICE — NEEDLE HYPO 18GA L1.5IN PNK POLYPR HUB S STL THN WALL FILL

## (undated) DEVICE — 6 ML SYRINGE LUER-LOCK TIP: Brand: MONOJECT

## (undated) DEVICE — NEEDLE HYPO 25GA L1.5IN BLU POLYPR HUB S STL REG BVL STR

## (undated) DEVICE — NEEDLE HYPO 18GA L1.5IN PNK POLYPR HUB S STL REG BVL STR